# Patient Record
Sex: MALE | Race: WHITE | NOT HISPANIC OR LATINO | Employment: FULL TIME | URBAN - METROPOLITAN AREA
[De-identification: names, ages, dates, MRNs, and addresses within clinical notes are randomized per-mention and may not be internally consistent; named-entity substitution may affect disease eponyms.]

---

## 2020-06-09 ENCOUNTER — HOSPITAL ENCOUNTER (OUTPATIENT)
Dept: URGENT CARE | Facility: CLINIC | Age: 60
Discharge: HOME | End: 2020-06-09
Attending: EMERGENCY MEDICINE

## 2020-07-27 ENCOUNTER — OFFICE VISIT CONVERTED (OUTPATIENT)
Dept: ORTHOPEDIC SURGERY | Facility: CLINIC | Age: 60
End: 2020-07-27
Attending: ORTHOPAEDIC SURGERY

## 2020-08-06 ENCOUNTER — HOSPITAL ENCOUNTER (OUTPATIENT)
Dept: CARDIOLOGY | Facility: HOSPITAL | Age: 60
Discharge: HOME | End: 2020-08-06
Attending: SURGERY

## 2020-08-08 ENCOUNTER — HOSPITAL ENCOUNTER (OUTPATIENT)
Dept: PREADMISSION TESTING | Facility: HOSPITAL | Age: 60
Discharge: HOME | End: 2020-08-08
Attending: SURGERY

## 2020-08-08 LAB — SARS-COV-2 RNA SPEC QL NAA+PROBE: NOT DETECTED

## 2020-08-13 ENCOUNTER — HOSPITAL ENCOUNTER (OUTPATIENT)
Dept: PERIOP | Facility: HOSPITAL | Age: 60
Setting detail: HOSPITAL OUTPATIENT SURGERY
Discharge: HOME | End: 2020-08-13
Attending: SURGERY

## 2020-08-13 LAB
ANION GAP SERPL CALC-SCNC: 13 MMOL/L (ref 8–19)
APTT BLD: 24.6 S (ref 22.2–34.2)
BASOPHILS # BLD AUTO: 0.05 10*3/UL (ref 0–0.2)
BASOPHILS NFR BLD AUTO: 0.9 % (ref 0–3)
BUN SERPL-MCNC: 15 MG/DL (ref 5–25)
BUN/CREAT SERPL: 17 {RATIO} (ref 6–20)
CALCIUM SERPL-MCNC: 9.7 MG/DL (ref 8.7–10.4)
CHLORIDE SERPL-SCNC: 108 MMOL/L (ref 99–111)
CONV ABS IMM GRAN: 0.01 10*3/UL (ref 0–0.2)
CONV CO2: 26 MMOL/L (ref 22–32)
CONV IMMATURE GRAN: 0.2 % (ref 0–1.8)
CREAT UR-MCNC: 0.86 MG/DL (ref 0.7–1.2)
DEPRECATED RDW RBC AUTO: 40.5 FL (ref 35.1–43.9)
EOSINOPHIL # BLD AUTO: 0.16 10*3/UL (ref 0–0.7)
EOSINOPHIL # BLD AUTO: 2.7 % (ref 0–7)
ERYTHROCYTE [DISTWIDTH] IN BLOOD BY AUTOMATED COUNT: 12.8 % (ref 11.6–14.4)
GFR SERPLBLD BASED ON 1.73 SQ M-ARVRAT: >60 ML/MIN/{1.73_M2}
GLUCOSE SERPL-MCNC: 121 MG/DL (ref 70–99)
HCT VFR BLD AUTO: 41.4 % (ref 42–52)
HGB BLD-MCNC: 13.9 G/DL (ref 14–18)
INR PPP: 0.93 (ref 2–3)
LYMPHOCYTES # BLD AUTO: 1.52 10*3/UL (ref 1–5)
LYMPHOCYTES NFR BLD AUTO: 25.9 % (ref 20–45)
MCH RBC QN AUTO: 29.3 PG (ref 27–31)
MCHC RBC AUTO-ENTMCNC: 33.6 G/DL (ref 33–37)
MCV RBC AUTO: 87.2 FL (ref 80–96)
MONOCYTES # BLD AUTO: 0.45 10*3/UL (ref 0.2–1.2)
MONOCYTES NFR BLD AUTO: 7.7 % (ref 3–10)
NEUTROPHILS # BLD AUTO: 3.68 10*3/UL (ref 2–8)
NEUTROPHILS NFR BLD AUTO: 62.6 % (ref 30–85)
NRBC CBCN: 0 % (ref 0–0.7)
OSMOLALITY SERPL CALC.SUM OF ELEC: 296 MOSM/KG (ref 273–304)
PLATELET # BLD AUTO: 177 10*3/UL (ref 130–400)
PMV BLD AUTO: 10.3 FL (ref 9.4–12.4)
POTASSIUM SERPL-SCNC: 4.5 MMOL/L (ref 3.5–5.3)
PROTHROMBIN TIME: 10.2 S (ref 9.4–12)
RBC # BLD AUTO: 4.75 10*6/UL (ref 4.7–6.1)
SODIUM SERPL-SCNC: 142 MMOL/L (ref 135–147)
WBC # BLD AUTO: 5.87 10*3/UL (ref 4.8–10.8)

## 2020-08-27 ENCOUNTER — HOSPITAL ENCOUNTER (OUTPATIENT)
Dept: CARDIOLOGY | Facility: HOSPITAL | Age: 60
Discharge: HOME | End: 2020-08-27
Attending: SURGERY

## 2020-09-09 ENCOUNTER — HOSPITAL ENCOUNTER (OUTPATIENT)
Dept: CARDIOLOGY | Facility: HOSPITAL | Age: 60
Discharge: HOME | End: 2020-09-09
Attending: SURGERY

## 2021-02-01 ENCOUNTER — OFFICE VISIT CONVERTED (OUTPATIENT)
Dept: ORTHOPEDIC SURGERY | Facility: CLINIC | Age: 61
End: 2021-02-01
Attending: ORTHOPAEDIC SURGERY

## 2021-05-10 NOTE — H&P
History and Physical      Patient Name: Rafael Cee   Patient ID: [id number]   Sex: Male   YOB: 1960        Visit Date: February 1, 2021    Provider: Andrez Kelly MD   [facility]   Location Address: [address] [place] [address]  [id number]   Location Phone: [phone]          Chief Complaint  · Right Ankle Pain  · Right Shoulder Pain      History Of Present Illness  Rafael Cee is a 60 year old male who presents today to [facility].      Patient presents today for an evaluation of right ankle pain and right shoulder pain. He has been having pain in his shoulder and ankle for several months. He states 8 months ago he had an injury where he was lifting something heavy at work and felt a sharp pain in his right shoulder. He is also complaining of generalized ankle pain and denies any trauma or injury to his ankle. He states he has had ankle sprains when he was younger.       Past Medical History  Limb Swelling         Past Surgical History  Colonoscopy; Hernia; Tonsillectomy         Medication List  Plavix 75 mg oral tablet         Allergy List  NSAIDS       Allergies Reconciled  Family Medical History  Cancer, Unspecified         Social History  Alcohol Use (Current some day); lives with other; .; Recreational Drug Use (Never); Tobacco (Current every day); Working         Review of Systems  · Constitutional  o Denies  o : fever, chills, weight loss  · Cardiovascular  o Denies  o : chest pain, shortness of breath  · Gastrointestinal  o Denies  o : liver disease, heartburn, nausea, blood in stools  · Genitourinary  o Denies  o : painful urination, blood in urine  · Integument  o Denies  o : rash, itching  · Neurologic  o Denies  o : headache, weakness, loss of consciousness  · Musculoskeletal  o Denies  o : painful, swollen joints  · Psychiatric  o Denies  o : drug/alcohol addiction, anxiety, depression      Vitals  Date Time BP Position Site L\R Cuff  "Size HR RR TEMP (F) WT  HT  BMI kg/m2 BSA m2 O2 Sat FR L/min FiO2 HC       02/01/2021 01:54 PM      70 - R   197lbs 6oz 5'  8\" 30.01 2.07 99 %            Physical Examination  · Constitutional  o Appearance  o : well developed, well-nourished, no obvious deformities present  · Head and Face  o Head  o :   § Inspection  § : normocephalic  o Face  o :   § Inspection  § : no facial lesions  · Eyes  o Conjunctivae  o : conjunctivae normal  o Sclerae  o : sclerae white  · Ears, Nose, Mouth and Throat  o Ears  o :   § External Ears  § : appearance within normal limits  § Hearing  § : intact  o Nose  o :   § External Nose  § : appearance normal  · Neck  o Inspection/Palpation  o : normal appearance  o Range of Motion  o : full range of motion  · Respiratory  o Respiratory Effort  o : breathing unlabored  o Inspection of Chest  o : normal appearance  o Auscultation of Lungs  o : no audible wheezing or rales  · Cardiovascular  o Heart  o : regular rate  · Gastrointestinal  o Abdominal Examination  o : soft and non-tender  · Skin and Subcutaneous Tissue  o General Inspection  o : intact, no rashes  · Psychiatric  o General  o : Alert and oriented x3  o Judgement and Insight  o : judgment and insight intact  o Mood and Affect  o : mood normal, affect appropriate  · Right Shoulder  o Inspection  o : Sensation grossly intact. Neurovascular intact. Skin intact. -30 forward flexion. IR SI joint. No swelling, skin discoloration or atrophy. -20 ER. Good tone of deltoid, biceps, triceps, wrist extensors, and wrist flexors.  · Right Ankle/Foot  o Inspection  o : Skin intact. Full range of motion of the ankle. A pes planus. Achilles intact. No swelling, skin discoloration or atrophy. Calf supple, non-tender. Full weight bearing. Good strength in quadriceps, hamstrings, dorsiflexors, and plantar flexors.  · Injection Note/Aspiration Note  o Site  o : right shoulder  o Procedure  o : Procedure: After educating the patient, patient gave " consent for procedure. After using Chloraprep, the joint space was injected. The patient tolerated the procedure well.   o Medication  o : 80 mg of DepoMedrol with 9cc of 1% Lidocaine  · In Office Procedures  o View  o : AP/LATERAL  o Site  o : right shoulder, right ankle  o Indication  o : Right shoulder pain. Right ankle pain.  o Study  o : X-rays ordered, taken in the office, and reviewed today.  o Xray  o : Negative impressions.           Assessment  · Right ankle pain     719.47/M25.571  · Right shoulder pain, unspecified chronicity     719.41/M25.511  · Adhesive capsulitis of right shoulder     726.0/M75.01  · Acquired pes planus of right foot     734/M21.41      Plan  · Orders  o Ankle (Right) 2 views X-Ray ([id number]-[place]Z) - 719.47/M25.571 - 02/01/2021  o Depo-Medrol injection 80mg () - - 02/01/2021   Lot [id number] Exp 01 2022 [employer] Administered by ANIKA Kelly MD  o Shoulder Intra-articular Injection without US Guidance [facility] (52651) - - 02/01/2021   Lot [id number] Exp 03 01 2022 [employer] Administered by ANIKA Kelly MD  o Scapula (Right) [facility] Preferred View (12773-DE) - 719.41/M25.511 - 02/01/2021  · Medications  o Medications have been Reconciled  o Transition of Care or Provider Policy  · Instructions  o Dr. Kelly saw and examined the patient and agrees with plan.   o X-rays reviewed by Dr. Kelly.  o Reviewed the patient's Past Medical, Social, and Family history as well as the ROS at today's visit, no changes.  o Call or return if worsening symptoms.  o Exercise handout given.  o Follow Up in 4 weeks.   o Follow up in 6 weeks.  o This note was transcribed by Dolores Srinivasan. nurys  o Discussed diagnosis and treatment plans with the patient. Patient wishes to proceed with physical therapy and at home exercise for frozen shoulder. We recommended arch support for the right ankle pain and physical therapy.   · Referrals  o ID: [id number] Date: 01/27/2021 Type: Inbound  Specialty: Orthopedic  Surgery            Electronically Signed by: Dolores Srinivasan-, Other -Author on February 5, 2021 02:49:39 PM  Electronically Co-signed by: Andrez Kelly MD -Reviewer on February 6, 2021 06:42:11 PM

## 2021-05-10 NOTE — H&P
History and Physical      Patient Name: Rafael Cee   Patient ID: [id number]   Sex: Male   YOB: 1960    Referring Provider: Aga ESTEVEZ    Visit Date: July 27, 2020    Provider: Andrez Kelly MD   Location: [facility]   Location Address: [address] [place] [address]  [id number]   Location Phone: [phone]          Chief Complaint  · Left leg pain       History Of Present Illness  Rafael Cee is a 60 year old male who presents today to [facility]. Patient is here for a left lower extremity pain. Patient states he had an ultrasound and x-ray. Patient states this has been going on for 4 months. Patient denies injury or trauma. Patient states when he walks, he has left lower leg fullness and feels like it is very heavy. Patient states he will stop walking, fullness with dissipate, it he starts again fullness will resume. He states it is reproducible and is predictable. He denies pain at night. Patient denies pain in the knee, ankle or groin. Patient had ultrasound and x-rays.       Past Medical History  Limb Swelling         Past Surgical History  Colonoscopy; Hernia; Tonsillectomy         Medication List  Plavix 75 mg oral tablet         Allergy List  NSAIDS         Family Medical History  Cancer, Unspecified         Social History  Alcohol Use (Current some day); lives with other; .; Recreational Drug Use (Never); Tobacco (Current every day); Working         Review of Systems  · Constitutional  o Denies  o : fever, chills, weight loss  · Cardiovascular  o Denies  o : chest pain, shortness of breath  · Gastrointestinal  o Denies  o : liver disease, heartburn, nausea, blood in stools  · Genitourinary  o Denies  o : painful urination, blood in urine  · Integument  o Denies  o : rash, itching  · Neurologic  o Denies  o : headache, weakness, loss of consciousness  · Musculoskeletal  o Admits  o : painful, swollen joints  · Psychiatric  o Denies  o : drug/alcohol  "addiction, anxiety, depression      Vitals  Date Time BP Position Site L\R Cuff Size HR RR TEMP (F) WT  HT  BMI kg/m2 BSA m2 O2 Sat HC       07/27/2020 09:52 AM      77 - R   188lbs 0oz 5'  8\" 28.59 2.02 98 %          Physical Examination  · Constitutional  o Appearance  o : well developed, well-nourished, no obvious deformities present  · Head and Face  o Head  o :   § Inspection  § : normocephalic  o Face  o :   § Inspection  § : no facial lesions  · Eyes  o Conjunctivae  o : conjunctivae normal  o Sclerae  o : sclerae white  · Ears, Nose, Mouth and Throat  o Ears  o :   § External Ears  § : appearance within normal limits  § Hearing  § : intact  o Nose  o :   § External Nose  § : appearance normal  · Neck  o Inspection/Palpation  o : normal appearance  o Range of Motion  o : full range of motion  · Respiratory  o Respiratory Effort  o : breathing unlabored  o Inspection of Chest  o : normal appearance  o Auscultation of Lungs  o : no audible wheezing or rales  · Cardiovascular  o Heart  o : regular rate  · Gastrointestinal  o Abdominal Examination  o : soft and non-tender  · Skin and Subcutaneous Tissue  o General Inspection  o : intact, no rashes  · Psychiatric  o General  o : Alert and oriented x3  o Judgement and Insight  o : judgment and insight intact  o Mood and Affect  o : mood normal, affect appropriate  · Left Hip  o Inspection  o : He has full hip flexion, internal and external rotation. Equal strength bilaterally.  · Left Knee  o Inspection  o : No skin discoloration, atrophy or swelling. Full extension. Full flexion.           Assessment  · Pain of left lower extremity     729.5/M79.605  · Claudication of left lower extremity     443.9/I73.9      Plan  · Medications  o Medications have been Reconciled  o Transition of Care or Provider Policy  · Instructions  o Reviewed the patient's Past Medical, Social, and Family history as well as the ROS at today's visit, no changes.  o Call or return if worsening " symptoms.  o This note is transcribed by Kenya ewing/nurys  o Patient states he has an appointment to see a vascular surgeon. Recommend continue to keep that appointment.             Electronically Signed by: Kenya Mina, -Author on July 29, 2020 12:42:56 PM  Electronically Co-signed by: Yanira Vega PA-C -Reviewer on July 29, 2020 12:46:48 PM  Electronically Co-signed by: Andrez Kelly MD -Reviewer on July 31, 2020 12:30:18 PM

## 2021-05-14 VITALS — HEART RATE: 70 BPM | WEIGHT: 197.37 LBS | BODY MASS INDEX: 29.91 KG/M2 | OXYGEN SATURATION: 99 % | HEIGHT: 68 IN

## 2021-05-15 VITALS — HEIGHT: 68 IN | HEART RATE: 77 BPM | WEIGHT: 188 LBS | OXYGEN SATURATION: 98 % | BODY MASS INDEX: 28.49 KG/M2

## 2021-06-24 ENCOUNTER — TRANSCRIBE ORDERS (OUTPATIENT)
Dept: VASCULAR SURGERY | Facility: HOSPITAL | Age: 61
End: 2021-06-24

## 2021-06-24 DIAGNOSIS — I71.40 ABDOMINAL AORTIC ANEURYSM (AAA) WITHOUT RUPTURE (HCC): Primary | ICD-10-CM

## 2021-12-06 ENCOUNTER — HOSPITAL ENCOUNTER (OUTPATIENT)
Dept: CARDIOLOGY | Facility: HOSPITAL | Age: 61
Discharge: HOME | End: 2021-12-06

## 2021-12-06 ENCOUNTER — OFFICE VISIT (OUTPATIENT)
Dept: VASCULAR SURGERY | Facility: HOSPITAL | Age: 61
End: 2021-12-06

## 2021-12-06 VITALS
TEMPERATURE: 98.5 F | OXYGEN SATURATION: 99 % | RESPIRATION RATE: 18 BRPM | HEART RATE: 64 BPM | DIASTOLIC BLOOD PRESSURE: 113 MMHG | SYSTOLIC BLOOD PRESSURE: 193 MMHG

## 2021-12-06 DIAGNOSIS — I73.9 PAD (PERIPHERAL ARTERY DISEASE) (HCC): Primary | ICD-10-CM

## 2021-12-06 DIAGNOSIS — I71.40 ABDOMINAL AORTIC ANEURYSM (AAA) WITHOUT RUPTURE (HCC): ICD-10-CM

## 2021-12-06 LAB
ABDOMINAL DIST AORTA AP: 1.6 CM
ABDOMINAL DIST AORTA TRANS: 1.6 CM
ABDOMINAL LT COM ILIAC AP: 1 CM
ABDOMINAL LT COM ILIAC TRANS: 1 CM
ABDOMINAL MID AORTA AP: 1.7 CM
ABDOMINAL MID AORTA TRANS: 1.7 CM
ABDOMINAL PROX AORTA AP: 2.2 CM
ABDOMINAL PROX AORTA TRANS: 2.2 CM
ABDOMINAL RT COM ILIAC AP: 1.1 CM
ABDOMINAL RT COM ILIAC TRANS: 1.1 CM
BH CV VAS SMA 1ST PP TIME: 15 MIN
BH CV VAS SMA 2ND PP TIME: 30 MIN
BH CV VAS SMA 3RD PP TIME: 45 MIN
MAXIMAL PREDICTED HEART RATE: 161 BPM
STRESS TARGET HR: 137 BPM

## 2021-12-06 PROCEDURE — 93979 VASCULAR STUDY: CPT

## 2021-12-06 PROCEDURE — 99212 OFFICE O/P EST SF 10 MIN: CPT | Performed by: SURGERY

## 2021-12-06 PROCEDURE — 93979 VASCULAR STUDY: CPT | Performed by: SURGERY

## 2021-12-06 RX ORDER — ERGOCALCIFEROL 1.25 MG/1
50000 CAPSULE ORAL WEEKLY
COMMUNITY
Start: 2021-10-15

## 2021-12-06 RX ORDER — ROSUVASTATIN CALCIUM 20 MG/1
20 TABLET, COATED ORAL DAILY
COMMUNITY
Start: 2021-10-01

## 2021-12-06 RX ORDER — BUPROPION HYDROCHLORIDE 150 MG/1
150 TABLET, EXTENDED RELEASE ORAL 2 TIMES DAILY
COMMUNITY
Start: 2021-11-13

## 2021-12-06 RX ORDER — LISINOPRIL 5 MG/1
5 TABLET ORAL DAILY
COMMUNITY
Start: 2021-10-15

## 2021-12-06 RX ORDER — CLOPIDOGREL BISULFATE 75 MG/1
75 TABLET ORAL DAILY
COMMUNITY
Start: 2021-10-01

## 2021-12-06 NOTE — PROGRESS NOTES
[facility]   Follow up Office    Patient Name: Rafael Cee  : 1960  MRN: [id number]  Primary Care Physician:  Aga Anderson PA-C      Subjective   Subjective     HPI:    Rafael Cee is a 61 y.o. male here for routine follow-up for PAD and an ulcerated plaque in his infrarenal aorta.  He has noticed some tightness in his left calf when he is walking.  Not nearly as bad as it used to be prior to intervention.      Objective     Vitals:   Temp:  [98.5 °F (36.9 °C)] 98.5 °F (36.9 °C)  Heart Rate:  [64] 64  Resp:  [18] 18  BP: (189-193)/(113-116) 193/113    Physical Exam      General: Alert, no acute distress.  Extremities: Symmetric.    Diagnostic studies: An aortic ultrasound in the office today demonstrates no evidence of an aneurysm.    Assessment/Plan   Assessment / Plan     Diagnoses and all orders for this visit:    1. PAD (peripheral artery disease) (HCC) (Primary)  -     Doppler Arterial Multi Level Lower Extremity - Bilateral CAR; Future       Assessment/Plan:   Mr. Cee's aortic ultrasound demonstrates no evidence of aneurysmal change.  There is some evidence of plaque.  He does describe symptoms consistent with moderately limiting intermittent claudication.  At this time the plan is to obtain an arterial Doppler prior to discussing possible reintervention.  He will follow-up with me after the above.        Electronically signed by Dakotah Barrios MD, 21, 10:02 AM EST.

## 2021-12-15 ENCOUNTER — HOSPITAL ENCOUNTER (OUTPATIENT)
Dept: CARDIOLOGY | Facility: HOSPITAL | Age: 61
Discharge: HOME | End: 2021-12-15

## 2021-12-15 ENCOUNTER — OFFICE VISIT (OUTPATIENT)
Dept: VASCULAR SURGERY | Facility: HOSPITAL | Age: 61
End: 2021-12-15

## 2021-12-15 VITALS
RESPIRATION RATE: 16 BRPM | OXYGEN SATURATION: 98 % | HEART RATE: 71 BPM | DIASTOLIC BLOOD PRESSURE: 93 MMHG | TEMPERATURE: 98.3 F | SYSTOLIC BLOOD PRESSURE: 149 MMHG

## 2021-12-15 DIAGNOSIS — I73.9 PAD (PERIPHERAL ARTERY DISEASE) (HCC): ICD-10-CM

## 2021-12-15 DIAGNOSIS — I73.9 PAD (PERIPHERAL ARTERY DISEASE) (HCC): Primary | ICD-10-CM

## 2021-12-15 LAB
BH CV LOWER ARTERIAL LEFT ABI RATIO: 0.81
BH CV LOWER ARTERIAL LEFT DORSALIS PEDIS SYS MAX: 109 MMHG
BH CV LOWER ARTERIAL LEFT GREAT TOE SYS MAX: 100 MMHG
BH CV LOWER ARTERIAL LEFT POPLITEAL SYS MAX: 123 MMHG
BH CV LOWER ARTERIAL LEFT POST TIBIAL SYS MAX: 121 MMHG
BH CV LOWER ARTERIAL LEFT TBI RATIO: 0.67
BH CV LOWER ARTERIAL RIGHT ABI RATIO: 1
BH CV LOWER ARTERIAL RIGHT DORSALIS PEDIS SYS MAX: 145 MMHG
BH CV LOWER ARTERIAL RIGHT GREAT TOE SYS MAX: 126 MMHG
BH CV LOWER ARTERIAL RIGHT LOW THIGH SYS MAX: 148 MMHG
BH CV LOWER ARTERIAL RIGHT POPLITEAL SYS MAX: 155 MMHG
BH CV LOWER ARTERIAL RIGHT POST TIBIAL SYS MAX: 159 MMHG
BH CV LOWER ARTERIAL RIGHT TBI RATIO: 0.85
MAXIMAL PREDICTED HEART RATE: 161 BPM
STRESS TARGET HR: 137 BPM
UPPER ARTERIAL LEFT ARM BRACHIAL SYS MAX: 149 MMHG
UPPER ARTERIAL RIGHT ARM BRACHIAL SYS MAX: 148 MMHG

## 2021-12-15 PROCEDURE — 93923 UPR/LXTR ART STDY 3+ LVLS: CPT

## 2021-12-15 PROCEDURE — 93923 UPR/LXTR ART STDY 3+ LVLS: CPT | Performed by: SURGERY

## 2021-12-15 PROCEDURE — 99212 OFFICE O/P EST SF 10 MIN: CPT | Performed by: SURGERY

## 2021-12-15 NOTE — PROGRESS NOTES
[facility]   Follow up Office    Patient Name: Rafael Cee  : 1960  MRN: [id number]  Primary Care Physician:  Aga Anderson PA-C      Subjective   Subjective     HPI:    Rafael Cee is a 61 y.o. male here for follow-up for peripheral vascular disease after an arterial Doppler.      Objective     Vitals:   Temp:  [98.3 °F (36.8 °C)] 98.3 °F (36.8 °C)  Heart Rate:  [71] 71  Resp:  [16] 16  BP: (148-149)/(86-93) 149/93    Physical Exam      General: Alert, no acute distress.  Extremities: Symmetric.    Diagnostic studies: An arterial Doppler in the office today demonstrates ABIs on the left side of 0.81.  The right side is greater than 1.  The distal waveforms on the left side are biphasic and demonstrate significant blunting when compared to the right.    Assessment/Plan   Assessment / Plan     Diagnoses and all orders for this visit:    1. PAD (peripheral artery disease) (HCC) (Primary)  -     Doppler Arterial Multi Level Lower Extremity - Bilateral CAR; Future       Assessment/Plan:   Mr. Cee has a history and physical findings as well as noninvasive studies consistent with restenosis of his previously intervened area of stenosis in the left superficial femoral artery.  I discussed with him further management options.  We discussed the alternative of no intervention versus repeat angiography with possible endovascular intervention.  At this time he feels that he would rather manage.  His symptoms are limited.  He describes significant pain in the right groin for several days after the previous procedure which gives him pause at this time.  The plan will be for a repeat arterial Doppler in 6 months.  He will follow-up sooner should symptoms worsen.        Electronically signed by Dakotah Barrios MD, 12/15/21, 3:34 PM EST.

## 2022-03-30 ENCOUNTER — OFFICE VISIT (OUTPATIENT)
Dept: VASCULAR SURGERY | Facility: HOSPITAL | Age: 62
End: 2022-03-30

## 2022-03-30 ENCOUNTER — HOSPITAL ENCOUNTER (OUTPATIENT)
Dept: CARDIOLOGY | Facility: HOSPITAL | Age: 62
Discharge: HOME | End: 2022-03-30

## 2022-03-30 VITALS
DIASTOLIC BLOOD PRESSURE: 94 MMHG | HEART RATE: 68 BPM | OXYGEN SATURATION: 98 % | RESPIRATION RATE: 18 BRPM | SYSTOLIC BLOOD PRESSURE: 135 MMHG | TEMPERATURE: 98.5 F

## 2022-03-30 DIAGNOSIS — I73.9 PAD (PERIPHERAL ARTERY DISEASE): ICD-10-CM

## 2022-03-30 DIAGNOSIS — I70.219 ATHEROSCLEROSIS OF LOWER EXTREMITY WITH CLAUDICATION: Primary | ICD-10-CM

## 2022-03-30 LAB
BH CV LOWER ARTERIAL LEFT ABI RATIO: 1
BH CV LOWER ARTERIAL LEFT CALF RATIO: 1.01
BH CV LOWER ARTERIAL LEFT DORSALIS PEDIS SYS MAX: 145
BH CV LOWER ARTERIAL LEFT GREAT TOE SYS MAX: 143
BH CV LOWER ARTERIAL LEFT POPLITEAL SYS MAX: 147
BH CV LOWER ARTERIAL LEFT POST TIBIAL SYS MAX: 146
BH CV LOWER ARTERIAL LEFT TBI RATIO: 0.98
BH CV LOWER ARTERIAL RIGHT ABI RATIO: 1.11
BH CV LOWER ARTERIAL RIGHT CALF RATIO: 1.06
BH CV LOWER ARTERIAL RIGHT DORSALIS PEDIS SYS MAX: 162
BH CV LOWER ARTERIAL RIGHT GREAT TOE SYS MAX: 136
BH CV LOWER ARTERIAL RIGHT LOW THIGH RATIO: 1.18
BH CV LOWER ARTERIAL RIGHT LOW THIGH SYS MAX: 173
BH CV LOWER ARTERIAL RIGHT POPLITEAL SYS MAX: 155
BH CV LOWER ARTERIAL RIGHT POST TIBIAL SYS MAX: 160
BH CV LOWER ARTERIAL RIGHT TBI RATIO: 0.93
MAXIMAL PREDICTED HEART RATE: 161 BPM
STRESS TARGET HR: 137 BPM
UPPER ARTERIAL LEFT ARM BRACHIAL SYS MAX: 135
UPPER ARTERIAL RIGHT ARM BRACHIAL SYS MAX: 146

## 2022-03-30 PROCEDURE — 93923 UPR/LXTR ART STDY 3+ LVLS: CPT

## 2022-03-30 PROCEDURE — 99213 OFFICE O/P EST LOW 20 MIN: CPT | Performed by: SURGERY

## 2022-03-30 PROCEDURE — 93923 UPR/LXTR ART STDY 3+ LVLS: CPT | Performed by: SURGERY

## 2022-03-30 NOTE — PROGRESS NOTES
[facility]   Follow up Office    Patient Name: Rafael Cee  : 1960  MRN: [id number]  Primary Care Physician:  Aga Anderson PA-C      Subjective   Subjective     HPI:    Rafael Cee is a 61 y.o. male with known peripheral vascular disease who in 2020 underwent angiography with endovascular intervention of the left lower extremity.  He did fairly well for about a year but most recently has started having symptoms and since last seen in December his symptoms have worsened.  He has significant left calf pain with ambulation and now most recently has been having pain in the left foot.      Objective     Vitals:   Temp:  [98.5 °F (36.9 °C)] 98.5 °F (36.9 °C)  Heart Rate:  [68] 68  Resp:  [18] 18  BP: (135-146)/(91-94) 135/94    Physical Exam      General: Alert, no acute distress.  Extremities: Symmetric.    Diagnostic studies: An arterial Doppler in the office today demonstrates ABIs of 1.1 on the right, 1.0 on the left.  There is blunting of the left sided waveforms when compared to the right.    Assessment/Plan   Assessment / Plan     Diagnoses and all orders for this visit:    1. Atherosclerosis of lower extremity with claudication (HCC) (Primary)  -     Case Request; Standing  -     Case Request       Assessment/Plan:   Mr. Cee is experiencing significant lifestyle limiting intermittent claudication of the left lower extremity.  Plan angiography with possible endovascular intervention.  I have discussed with the patient in detail the mechanics of the procedure, the indications, benefits, risks, alternatives as well as potential complications to include but not limited to infection, bleeding, inability to cross the occlusion, vascular injury requiring surgical repair.  He appears to understand and desires to proceed.        Electronically signed by Dakotah Barrios MD, 22, 9:33 AM EDT.

## 2022-04-13 NOTE — H&P
[facility]   HISTORY AND PHYSICAL    Patient Name: Rafael Cee  : 1960  MRN: [id number]  Primary Care Physician:  Aga Anderson PA-C  Date of admission: (Not on file)    Subjective   Subjective     Chief Complaint: Left leg claudication    HPI:    Rafael Cee is a 61 y.o. male with known peripheral artery disease with recurrent left leg claudication    Review of Systems    Non contributory except for the History of Present Illness    Personal History     Past Medical History:   Diagnosis Date   • Hyperlipidemia    • Hypertension        No past surgical history on file.    Family History: family history includes Cancer in his mother and sister; Heart attack in his father. Otherwise pertinent FHx was reviewed and not pertinent to current issue.    Social History:  reports that he has been smoking. He has been smoking about 0.50 packs per day. He has never used smokeless tobacco. He reports current alcohol use of about 3.0 standard drinks of alcohol per week. He reports that he does not use drugs.    Home Medications:  No current facility-administered medications on file prior to encounter.     Current Outpatient Medications on File Prior to Encounter   Medication Sig   • buPROPion SR (WELLBUTRIN SR) 150 MG 12 hr tablet Take 150 mg by mouth 2 (Two) Times a Day.   • clopidogrel (PLAVIX) 75 MG tablet Take 75 mg by mouth Daily.   • lisinopril (PRINIVIL,ZESTRIL) 5 MG tablet Take 5 mg by mouth Daily.   • rosuvastatin (CRESTOR) 20 MG tablet Take 20 mg by mouth Daily.   • vitamin D (ERGOCALCIFEROL) 1.25 MG (53749 UT) capsule capsule Take 50,000 Units by mouth 1 (One) Time Per Week.          Allergies:  Allergies   Allergen Reactions   • Nsaids Anaphylaxis       Objective   Objective     Vitals:        Physical Exam   General: Alert, no acute distress.  Neck: Supple  Heart: Regular rate  Lungs: Clear  Abdomen: Benign  Extremities: Symmetric  Pulses: Nonpalpable left pedal pulses.    Diagnostic studies:   An  arterial Doppler dated 3/30/2022 demonstrates ABIs of 1.1 on the right, 1.0 on the left.  There is blunting of the left sided waveforms when compared to the right.    Assessment/Plan   Assessment / Plan     Active Hospital Problems:  Active Hospital Problems    Diagnosis    • **Atherosclerosis of lower extremity with claudication (HCC)      Added automatically from request for surgery [id number]         Diagnoses and all orders for this visit:    1. Atherosclerosis of lower extremity with claudication (HCC)  -     Cardiac Catheterization/Vascular Study; Standing  -     Cardiac Catheterization/Vascular Study        Assessment/plan:   Mr. Cee is experiencing significant lifestyle limiting intermittent claudication of the left lower extremity.  Plan angiography with possible endovascular intervention.  I have discussed with the patient in detail the mechanics of the procedure, the indications, benefits, risks, alternatives as well as potential complications to include but not limited to infection, bleeding, inability to cross the occlusion, vascular injury requiring surgical repair.  He appears to understand and desires to proceed.      Electronically signed by Dakotah Barrios MD, 04/13/22, 3:27 PM EDT.

## 2022-04-14 ENCOUNTER — HOSPITAL ENCOUNTER (OUTPATIENT)
Facility: HOSPITAL | Age: 62
Setting detail: HOSPITAL OUTPATIENT SURGERY
Discharge: HOME | End: 2022-04-14
Attending: SURGERY | Admitting: SURGERY

## 2022-04-14 VITALS
SYSTOLIC BLOOD PRESSURE: 151 MMHG | RESPIRATION RATE: 18 BRPM | HEIGHT: 68 IN | WEIGHT: 190 LBS | TEMPERATURE: 97.9 F | DIASTOLIC BLOOD PRESSURE: 91 MMHG | OXYGEN SATURATION: 100 % | HEART RATE: 72 BPM | BODY MASS INDEX: 28.79 KG/M2

## 2022-04-14 DIAGNOSIS — I70.219 ATHEROSCLEROSIS OF LOWER EXTREMITY WITH CLAUDICATION: ICD-10-CM

## 2022-04-14 LAB
ANION GAP SERPL CALCULATED.3IONS-SCNC: 10 MMOL/L (ref 5–15)
BASOPHILS # BLD AUTO: 0.03 10*3/MM3 (ref 0–0.2)
BASOPHILS NFR BLD AUTO: 0.7 % (ref 0–1.5)
BUN SERPL-MCNC: 14 MG/DL (ref 6–20)
BUN/CREAT SERPL: 16.3 (ref 7–25)
CALCIUM SPEC-SCNC: 9.4 MG/DL (ref 8.6–10.5)
CHLORIDE SERPL-SCNC: 104 MMOL/L (ref 98–107)
CO2 SERPL-SCNC: 23 MMOL/L (ref 22–29)
CREAT SERPL-MCNC: 0.86 MG/DL (ref 0.76–1.27)
DEPRECATED RDW RBC AUTO: 37.3 FL (ref 37–54)
EGFRCR SERPLBLD CKD-EPI 2021: 99.7 ML/MIN/1.73
EOSINOPHIL # BLD AUTO: 0.19 10*3/MM3 (ref 0–0.4)
EOSINOPHIL NFR BLD AUTO: 4.4 % (ref 0.3–6.2)
ERYTHROCYTE [DISTWIDTH] IN BLOOD BY AUTOMATED COUNT: 12.3 % (ref 12.3–15.4)
GLUCOSE SERPL-MCNC: 118 MG/DL (ref 65–99)
HCT VFR BLD AUTO: 43.4 % (ref 37.5–51)
HGB BLD-MCNC: 15.1 G/DL (ref 13–17.7)
IMM GRANULOCYTES # BLD AUTO: 0.01 10*3/MM3 (ref 0–0.05)
IMM GRANULOCYTES NFR BLD AUTO: 0.2 % (ref 0–0.5)
LYMPHOCYTES # BLD AUTO: 1.28 10*3/MM3 (ref 0.7–3.1)
LYMPHOCYTES NFR BLD AUTO: 29.8 % (ref 19.6–45.3)
MCH RBC QN AUTO: 28.9 PG (ref 26.6–33)
MCHC RBC AUTO-ENTMCNC: 34.8 G/DL (ref 31.5–35.7)
MCV RBC AUTO: 83 FL (ref 79–97)
MONOCYTES # BLD AUTO: 0.83 10*3/MM3 (ref 0.1–0.9)
MONOCYTES NFR BLD AUTO: 19.3 % (ref 5–12)
NEUTROPHILS NFR BLD AUTO: 1.96 10*3/MM3 (ref 1.7–7)
NEUTROPHILS NFR BLD AUTO: 45.6 % (ref 42.7–76)
NRBC BLD AUTO-RTO: 0 /100 WBC (ref 0–0.2)
PLATELET # BLD AUTO: 173 10*3/MM3 (ref 140–450)
PMV BLD AUTO: 10.5 FL (ref 6–12)
POTASSIUM SERPL-SCNC: 4.3 MMOL/L (ref 3.5–5.2)
RBC # BLD AUTO: 5.23 10*6/MM3 (ref 4.14–5.8)
SODIUM SERPL-SCNC: 137 MMOL/L (ref 136–145)
WBC NRBC COR # BLD: 4.3 10*3/MM3 (ref 3.4–10.8)

## 2022-04-14 PROCEDURE — C1769 GUIDE WIRE: HCPCS | Performed by: SURGERY

## 2022-04-14 PROCEDURE — C1887 CATHETER, GUIDING: HCPCS | Performed by: SURGERY

## 2022-04-14 PROCEDURE — 25010000002 FENTANYL CITRATE (PF) 50 MCG/ML SOLUTION: Performed by: SURGERY

## 2022-04-14 PROCEDURE — 75710 ARTERY X-RAYS ARM/LEG: CPT | Performed by: SURGERY

## 2022-04-14 PROCEDURE — C1760 CLOSURE DEV, VASC: HCPCS | Performed by: SURGERY

## 2022-04-14 PROCEDURE — C1894 INTRO/SHEATH, NON-LASER: HCPCS | Performed by: SURGERY

## 2022-04-14 PROCEDURE — 75625 CONTRAST EXAM ABDOMINL AORTA: CPT | Performed by: SURGERY

## 2022-04-14 PROCEDURE — 99152 MOD SED SAME PHYS/QHP 5/>YRS: CPT | Performed by: SURGERY

## 2022-04-14 PROCEDURE — 25010000002 HEPARIN (PORCINE) PER 1000 UNITS: Performed by: SURGERY

## 2022-04-14 PROCEDURE — 80048 BASIC METABOLIC PNL TOTAL CA: CPT | Performed by: SURGERY

## 2022-04-14 PROCEDURE — 0 IODIXANOL PER 1 ML: Performed by: SURGERY

## 2022-04-14 PROCEDURE — 25010000002 MIDAZOLAM PER 1 MG: Performed by: SURGERY

## 2022-04-14 PROCEDURE — 37224 PR REVSC OPN/PRG FEM/POP W/ANGIOPLASTY UNI: CPT | Performed by: SURGERY

## 2022-04-14 PROCEDURE — C1725 CATH, TRANSLUMIN NON-LASER: HCPCS | Performed by: SURGERY

## 2022-04-14 PROCEDURE — 85025 COMPLETE CBC W/AUTO DIFF WBC: CPT | Performed by: SURGERY

## 2022-04-14 PROCEDURE — 99153 MOD SED SAME PHYS/QHP EA: CPT | Performed by: SURGERY

## 2022-04-14 PROCEDURE — C2623 CATH, TRANSLUMIN, DRUG-COAT: HCPCS | Performed by: SURGERY

## 2022-04-14 RX ORDER — HEPARIN SODIUM 1000 [USP'U]/ML
INJECTION, SOLUTION INTRAVENOUS; SUBCUTANEOUS AS NEEDED
Status: DISCONTINUED | OUTPATIENT
Start: 2022-04-14 | End: 2022-04-14 | Stop reason: HOSPADM

## 2022-04-14 RX ORDER — MIDAZOLAM HYDROCHLORIDE 1 MG/ML
INJECTION INTRAMUSCULAR; INTRAVENOUS AS NEEDED
Status: DISCONTINUED | OUTPATIENT
Start: 2022-04-14 | End: 2022-04-14 | Stop reason: HOSPADM

## 2022-04-14 RX ORDER — CEFAZOLIN SODIUM 2 G/100ML
2 INJECTION, SOLUTION INTRAVENOUS ONCE
Status: DISCONTINUED | OUTPATIENT
Start: 2022-04-14 | End: 2022-04-14 | Stop reason: HOSPADM

## 2022-04-14 RX ORDER — IODIXANOL 320 MG/ML
INJECTION, SOLUTION INTRAVASCULAR AS NEEDED
Status: DISCONTINUED | OUTPATIENT
Start: 2022-04-14 | End: 2022-04-14 | Stop reason: HOSPADM

## 2022-04-14 RX ORDER — FENTANYL CITRATE 50 UG/ML
INJECTION, SOLUTION INTRAMUSCULAR; INTRAVENOUS AS NEEDED
Status: DISCONTINUED | OUTPATIENT
Start: 2022-04-14 | End: 2022-04-14 | Stop reason: HOSPADM

## 2022-04-14 RX ORDER — LIDOCAINE HYDROCHLORIDE 20 MG/ML
INJECTION, SOLUTION INFILTRATION; PERINEURAL AS NEEDED
Status: DISCONTINUED | OUTPATIENT
Start: 2022-04-14 | End: 2022-04-14 | Stop reason: HOSPADM

## 2022-04-14 RX ORDER — SODIUM CHLORIDE 9 MG/ML
100 INJECTION, SOLUTION INTRAVENOUS CONTINUOUS
Status: DISCONTINUED | OUTPATIENT
Start: 2022-04-14 | End: 2022-04-14 | Stop reason: HOSPADM

## 2022-04-14 NOTE — DISCHARGE INSTRUCTIONS
May remove dressing in 1 day and wash area.  Follow-up in the office in 2 weeks, call for appointment.  Resume home diet.  Resume home medications.  No lifting greater than 15 pounds for 3 days.

## 2022-05-02 ENCOUNTER — OFFICE VISIT (OUTPATIENT)
Dept: VASCULAR SURGERY | Facility: HOSPITAL | Age: 62
End: 2022-05-02

## 2022-05-02 VITALS
DIASTOLIC BLOOD PRESSURE: 92 MMHG | HEART RATE: 68 BPM | SYSTOLIC BLOOD PRESSURE: 162 MMHG | RESPIRATION RATE: 16 BRPM | TEMPERATURE: 98.1 F | OXYGEN SATURATION: 99 %

## 2022-05-02 DIAGNOSIS — I70.219 ATHEROSCLEROSIS OF LOWER EXTREMITY WITH CLAUDICATION: Primary | ICD-10-CM

## 2022-05-02 DIAGNOSIS — I73.9 PAD (PERIPHERAL ARTERY DISEASE): ICD-10-CM

## 2022-05-02 PROCEDURE — G0463 HOSPITAL OUTPT CLINIC VISIT: HCPCS | Performed by: SURGERY

## 2022-05-02 PROCEDURE — 99213 OFFICE O/P EST LOW 20 MIN: CPT | Performed by: SURGERY

## 2022-05-02 RX ORDER — CLOPIDOGREL BISULFATE 75 MG/1
75 TABLET ORAL DAILY
Qty: 30 TABLET | Refills: 1 | Status: SHIPPED | OUTPATIENT
Start: 2022-05-02 | End: 2022-07-01

## 2022-05-02 NOTE — PROGRESS NOTES
[facility]   Follow up Office    Patient Name: Rafael Cee  : 1960  MRN: [id number]  Primary Care Physician:  Aga Anderson PA-C      Subjective   Subjective     HPI:    Rafael Cee is a 62 y.o. male here for routine follow-up after angiography with endovascular intervention 2022.  Doing very good.  No complaints.      Objective     Vitals:   Temp:  [98.1 °F (36.7 °C)] 98.1 °F (36.7 °C)  Heart Rate:  [68] 68  Resp:  [16] 16  BP: (162)/(92) 162/92    Physical Exam      General: Alert, no acute distress.  Extremities: Symmetric.    Assessment/Plan   Assessment / Plan     Diagnoses and all orders for this visit:    1. Atherosclerosis of lower extremity with claudication (HCC) (Primary)  -     Duplex Lower Extremity Art / Grafts - Left CAR; Future  -     clopidogrel (Plavix) 75 MG tablet; Take 1 tablet by mouth Daily for 60 days.  Dispense: 30 tablet; Refill: 1    2. PAD (peripheral artery disease) (HCC)  -     Duplex Lower Extremity Art / Grafts - Left CAR; Future  -     clopidogrel (Plavix) 75 MG tablet; Take 1 tablet by mouth Daily for 60 days.  Dispense: 30 tablet; Refill: 1       Assessment/Plan:   Satisfactory progress following angiography with endovascular intervention.  Plavix daily for 2 months.  Patient allergic to NSAIDs.  Follow-up with a duplex of the left leg in 3 months.        Electronically signed by Dakotah Barrios MD, 22, 9:02 AM EDT.

## 2022-08-03 ENCOUNTER — OFFICE VISIT (OUTPATIENT)
Dept: VASCULAR SURGERY | Facility: HOSPITAL | Age: 62
End: 2022-08-03

## 2022-08-03 ENCOUNTER — HOSPITAL ENCOUNTER (OUTPATIENT)
Dept: CARDIOLOGY | Facility: HOSPITAL | Age: 62
Discharge: HOME | End: 2022-08-03

## 2022-08-03 VITALS
SYSTOLIC BLOOD PRESSURE: 153 MMHG | HEART RATE: 74 BPM | HEIGHT: 68 IN | RESPIRATION RATE: 18 BRPM | BODY MASS INDEX: 28.79 KG/M2 | DIASTOLIC BLOOD PRESSURE: 98 MMHG | TEMPERATURE: 98.4 F | OXYGEN SATURATION: 98 % | WEIGHT: 190 LBS

## 2022-08-03 DIAGNOSIS — I70.219 ATHEROSCLEROSIS OF LOWER EXTREMITY WITH CLAUDICATION: Primary | ICD-10-CM

## 2022-08-03 DIAGNOSIS — I73.9 PAD (PERIPHERAL ARTERY DISEASE): ICD-10-CM

## 2022-08-03 DIAGNOSIS — I70.219 ATHEROSCLEROSIS OF LOWER EXTREMITY WITH CLAUDICATION: ICD-10-CM

## 2022-08-03 LAB
BH CV GRAFT BRACHIAL PRESSURE LEFT: 153 MMHG
BH CV GRAFT BRACHIAL PRESSURE RIGHT: 150 MMHG
BH CV LEA LEFT ANT TIBIAL A DISTAL EDV: 10 CM/S
BH CV LEA LEFT ANT TIBIAL A DISTAL PSV: 59 CM/S
BH CV LEA LEFT ANT TIBIAL A MID EDV: 13 CM/S
BH CV LEA LEFT ANT TIBIAL A MID PSV: 77 CM/S
BH CV LEA LEFT ANT TIBIAL A PROX EDV: 10 CM/S
BH CV LEA LEFT ANT TIBIAL A PROX PSV: 77 CM/S
BH CV LEA LEFT CFA DISTAL EDV: 0 CM/S
BH CV LEA LEFT CFA DISTAL PSV: 178 CM/S
BH CV LEA LEFT CFA PROX EDV: 17 CM/S
BH CV LEA LEFT CFA PROX PSV: 149 CM/S
BH CV LEA LEFT DFA PROX EDV: 0 CM/S
BH CV LEA LEFT DFA PROX PSV: 81 CM/S
BH CV LEA LEFT DPA PRESSURE: 165 MMHG
BH CV LEA LEFT PERONEAL  DISTAL EDV: 11 CM/S
BH CV LEA LEFT PERONEAL  DISTAL PSV: 69 CM/S
BH CV LEA LEFT PERONEAL  MID EDV: 12 CM/S
BH CV LEA LEFT PERONEAL  MID PSV: 66 CM/S
BH CV LEA LEFT POPITEAL A  DISTAL EDV: 13 CM/S
BH CV LEA LEFT POPITEAL A  DISTAL PSV: 100 CM/S
BH CV LEA LEFT POPITEAL A  MID EDV: 18 CM/S
BH CV LEA LEFT POPITEAL A  MID PSV: 148 CM/S
BH CV LEA LEFT POPITEAL A  PROX EDV: 11 CM/S
BH CV LEA LEFT POPITEAL A  PROX PSV: 81 CM/S
BH CV LEA LEFT PTA DISTAL EDV: 10 CM/S
BH CV LEA LEFT PTA DISTAL PSV: 86 CM/S
BH CV LEA LEFT PTA MID EDV: 0 CM/S
BH CV LEA LEFT PTA MID PSV: 55 CM/S
BH CV LEA LEFT PTA PRESSURE: 89 MMHG
BH CV LEA LEFT PTA PROX EDV: 0 CM/S
BH CV LEA LEFT PTA PROX PSV: 107 CM/S
BH CV LEA LEFT SFA DISTAL EDV: 12 CM/S
BH CV LEA LEFT SFA DISTAL PSV: 103 CM/S
BH CV LEA LEFT SFA MID EDV: 0 CM/S
BH CV LEA LEFT SFA MID PSV: 99 CM/S
BH CV LEA LEFT SFA PROX EDV: 0 CM/S
BH CV LEA LEFT SFA PROX PSV: 136 CM/S
BH CV LEA LEFT TIBEOPERONEAL EDV: 10 CM/S
BH CV LEA LEFT TIBEOPERONEAL PSV: 92 CM/S
BH CV LEA RIGHT DPA PRESSURE: 153 MMHG
BH CV LEA RIGHT PTA PRESSURE: 148 MMHG
BH CV LOWER ARTERIAL LEFT ABI RATIO: 1.08
BH CV LOWER ARTERIAL RIGHT ABI RATIO: 1
BH CV VAS LEA LEFT STENT ONE DIST STENT EDV: 12 CM/S
BH CV VAS LEA LEFT STENT ONE DIST STENT PSV: 103 CM/S
BH CV VAS LEA LEFT STENT ONE MID STENT EDV: 0 CM/S
BH CV VAS LEA LEFT STENT ONE MID STENT PSV: 99 CM/S
BH CV VAS LEA LEFT STENT ONE POST STENT EDV: 11 CM/S
BH CV VAS LEA LEFT STENT ONE POST STENT PSV: 56 CM/S
BH CV VAS LEA LEFT STENT ONE PRE STENT EDV: 11 CM/S
BH CV VAS LEA LEFT STENT ONE PRE STENT PSV: 116 CM/S
BH CV VAS LEA LEFT STENT ONE PROX STENT EDV: 11 CM/S
BH CV VAS LEA LEFT STENT ONE PROX STENT PSV: 112 CM/S
MAXIMAL PREDICTED HEART RATE: 160 BPM
STRESS TARGET HR: 136 BPM

## 2022-08-03 PROCEDURE — 93926 LOWER EXTREMITY STUDY: CPT

## 2022-08-03 PROCEDURE — 99213 OFFICE O/P EST LOW 20 MIN: CPT | Performed by: NURSE PRACTITIONER

## 2022-08-03 PROCEDURE — 93926 LOWER EXTREMITY STUDY: CPT | Performed by: SURGERY

## 2022-08-03 NOTE — PROGRESS NOTES
[facility] Vascular Surgery Office Follow Up Note     Date of Encounter: 08/03/2022     MRN Number: [id number]  Name: Rafael Cee  Phone Number: [phone]     Referred By: No ref. provider found  PCP: Aga Anderson PA-C    Chief Complaint:    Chief Complaint   Patient presents with   • Follow-up     3 month follow up with testing   Mallory rt pt 0.97 dp 1.00  Lt pt 0.58 dp 1.08       Subjective      History of Present Illness:  Rafael Cee is a 62 y.o. male presents for 3-month follow-up following a angiogram with endovascular intervention on 4/14/2022.  Mr. Mensah states that his legs are not much better after walking a short distance.  He states that he cannot even take his trash cans to the end of the driveway without extreme pain from the knees down.   He describes the pain as a extreme tightness/cramp  In the lower extremity (especially the calves) and if he tries to continues it progresses above the knee and he feels his feet are going to explode.  He denies any back pain or injuries. No other complaints at this time.    Review of Systems:  Review of Systems   Constitutional: Negative.   HENT: Negative.    Cardiovascular: Negative.    Respiratory: Negative.    Skin: Negative.    Musculoskeletal: Negative.    Gastrointestinal: Negative.    Neurological: Negative.    Psychiatric/Behavioral: Negative.      I have reviewed the following portions of the patient's history: allergies, current medications, past family history, past medical history, past social history, past surgical history and problem list and confirm it's accurate.    Allergies:  Allergies   Allergen Reactions   • Aspirin Anaphylaxis     unsure   • Ibuprofen Anaphylaxis   • Nsaids Anaphylaxis       Medications:      Current Outpatient Medications:   •  buPROPion SR (WELLBUTRIN SR) 150 MG 12 hr tablet, Take 150 mg by mouth 2 (Two) Times a Day., Disp: , Rfl:   •  clopidogrel (PLAVIX) 75 MG tablet, Take 75 mg by mouth Daily., Disp:  ", Rfl:   •  lisinopril (PRINIVIL,ZESTRIL) 5 MG tablet, Take 5 mg by mouth Daily., Disp: , Rfl:   •  rosuvastatin (CRESTOR) 20 MG tablet, Take 20 mg by mouth Daily., Disp: , Rfl:   •  vitamin D (ERGOCALCIFEROL) 1.25 MG (03289 UT) capsule capsule, Take 50,000 Units by mouth 1 (One) Time Per Week., Disp: , Rfl:     History:   Past Medical History:   Diagnosis Date   • Hyperlipidemia    • Hypertension        Past Surgical History:   Procedure Laterality Date   • CARDIAC CATHETERIZATION Left 4/14/2022    Procedure: Left leg angiogram, possible angioplasty or stenting;  Surgeon: Dakotah Barrios MD;  Location: [facility] CATH INVASIVE LOCATION;  Service: Vascular;  Laterality: Left;       Social History     Socioeconomic History   • Marital status: Legally    • Number of children: 10   Tobacco Use   • Smoking status: Current Every Day Smoker     Packs/day: 1.00   • Smokeless tobacco: Never Used   • Tobacco comment: PT IS TRYING TO QUIT   Vaping Use   • Vaping Use: Never used   Substance and Sexual Activity   • Alcohol use: Yes     Alcohol/week: 3.0 standard drinks     Types: 3 Cans of beer per week   • Drug use: Never   • Sexual activity: Defer        Family History   Problem Relation Age of Onset   • Cancer Mother    • Heart attack Father    • Cancer Sister        Objective     Physical Exam:  Vitals:    08/03/22 0948 08/03/22 0949 08/03/22 0950 08/03/22 0951   BP: 173/91 150/91 153/96 153/98   BP Location: Right arm Right arm Left arm Left arm   Patient Position: Sitting Sitting Sitting Sitting   Cuff Size: Adult Adult Adult Adult   Pulse:    74   Resp:    18   Temp:    98.4 °F (36.9 °C)   TempSrc:    Temporal   SpO2:    98%   Weight:    86.2 kg (190 lb)   Height:    172.7 cm (68\")      Body mass index is 28.89 kg/m².    Physical Exam  Physical Exam  Constitutional:       Appearance: Normal appearance.   HENT:      Head: Normocephalic.   Cardiovascular:      Rate and Rhythm: Normal rate.      Pulses: Normal pulses. "      Comments: Lateral lower extremities +2 palpable dorsalis pedis pulse.  Pulmonary:      Effort: Pulmonary effort is normal.   Musculoskeletal:         General: Normal range of motion.      Cervical back: Normal range of motion.   Skin:     General: Skin is warm and dry.      Capillary Refill: Capillary refill takes less than 2 seconds.      Comments: Bilateral lower extremities: Warm, no open ulcers.  Neurological:      General: No focal deficit present.      Mental Status: Alert and oriented to person, place, and time.   Psychiatric:         Mood and Affect: Mood normal.         Behavior: Behavior normal.    Imaging/Labs:    I have reviewed the preliminary results of the arterial Doppler performed today.  The left stents appear patent and ABIs are 1 on the right and 1.08 on the left.        Assessment / Plan      Assessment / Plan:  Diagnoses and all orders for this visit:    1. Atherosclerosis of lower extremity with claudication (HCC) (Primary)  -     Doppler Arterial Lower Extremity Stress CAR; Future       Mr. Cee continues to complain of lifestyle limiting claudication symptoms after walking a short distance.  The duplex today reveals the left leg stent is patent along with ABIs that are 1.0 on the right and 1.08 on the left.  I recommend that he continue taking his plavix (he is allergic to aspirin) and that we obtain a arterial Doppler with exercise stress.  I have answered all of his questions and he is in agreement with the plan at this time.    Thank you for allowing me to participate in your patient's care.    Patient Education: Encouraged smoking cessation and he acknowledges the need to quit but denies at this time.        Follow Up:   Return for arterial doppler with stress .   Or sooner for any further concerns or worsening sign and symptoms. If unable to reach us in the office please dial 911 or go to the nearest emergency department.      José Luis BURNS  [facility] Vascular  Surgery

## 2022-08-29 ENCOUNTER — HOSPITAL ENCOUNTER (OUTPATIENT)
Dept: CARDIOLOGY | Facility: HOSPITAL | Age: 62
Discharge: HOME | End: 2022-08-29

## 2022-08-29 ENCOUNTER — OFFICE VISIT (OUTPATIENT)
Dept: VASCULAR SURGERY | Facility: HOSPITAL | Age: 62
End: 2022-08-29

## 2022-08-29 VITALS
SYSTOLIC BLOOD PRESSURE: 138 MMHG | HEART RATE: 77 BPM | BODY MASS INDEX: 28.79 KG/M2 | HEIGHT: 68 IN | RESPIRATION RATE: 16 BRPM | OXYGEN SATURATION: 98 % | TEMPERATURE: 97.8 F | WEIGHT: 190 LBS | DIASTOLIC BLOOD PRESSURE: 82 MMHG

## 2022-08-29 DIAGNOSIS — I70.219 ATHEROSCLEROSIS OF LOWER EXTREMITY WITH CLAUDICATION: Primary | ICD-10-CM

## 2022-08-29 DIAGNOSIS — I70.219 ATHEROSCLEROSIS OF LOWER EXTREMITY WITH CLAUDICATION: ICD-10-CM

## 2022-08-29 LAB
BH CV LOWER ARTERIAL LEFT ABI RATIO: 1.11
BH CV LOWER ARTERIAL LEFT DORSALIS PEDIS SYS MAX: 143
BH CV LOWER ARTERIAL LEFT GREAT TOE SYS MAX: 119
BH CV LOWER ARTERIAL LEFT POPLITEAL SYS MAX: 152
BH CV LOWER ARTERIAL LEFT POST TIBIAL SYS MAX: 166
BH CV LOWER ARTERIAL LEFT TBI RATIO: 0.79
BH CV LOWER ARTERIAL RIGHT ABI RATIO: 0.98
BH CV LOWER ARTERIAL RIGHT DORSALIS PEDIS SYS MAX: 142
BH CV LOWER ARTERIAL RIGHT GREAT TOE SYS MAX: 129
BH CV LOWER ARTERIAL RIGHT LOW THIGH SYS MAX: 158
BH CV LOWER ARTERIAL RIGHT POPLITEAL SYS MAX: 145
BH CV LOWER ARTERIAL RIGHT POST TIBIAL SYS MAX: 145
BH CV LOWER ARTERIAL RIGHT TBI RATIO: 0.86
MAXIMAL PREDICTED HEART RATE: 160 BPM
STRESS TARGET HR: 136 BPM
UPPER ARTERIAL LEFT ARM BRACHIAL SYS MAX: 139 MMHG
UPPER ARTERIAL RIGHT ARM BRACHIAL SYS MAX: 150 MMHG

## 2022-08-29 PROCEDURE — 99213 OFFICE O/P EST LOW 20 MIN: CPT | Performed by: NURSE PRACTITIONER

## 2022-08-29 PROCEDURE — 93924 LWR XTR VASC STDY BILAT: CPT

## 2022-08-29 PROCEDURE — 93924 LWR XTR VASC STDY BILAT: CPT | Performed by: SURGERY

## 2022-08-29 NOTE — PROGRESS NOTES
[facility] Vascular Surgery Office Follow Up Note     Date of Encounter: 08/29/2022     MRN Number: [id number]  Name: Rafael Cee  Phone Number: [phone]     Referred By: No ref. provider found  PCP: Aga Anderson PA-C    Chief Complaint:    Chief Complaint   Patient presents with   • Follow-up     Follow up with adelina        Subjective      History of Present Illness:    Rafael Cee is a 62 y.o. male presents for follow-up with an arterial with stress.  He states that his leg pain after walking a short distance or incline has not improved.  He states his driveway is the worst because it is an incline that he has to stop because of the calf pain.  He is a current everyday smoker, smokes approximately 1 pack/day.     Review of Systems:  ROS  Review of Systems   Constitutional: Negative.   HENT: Negative.    Cardiovascular: Negative.    Respiratory: Negative.    Skin: Negative.    Musculoskeletal:  Bilateral leg pain.   Gastrointestinal: Negative.    Neurological: Negative.    Psychiatric/Behavioral: Negative.      I have reviewed the following portions of the patient's history: allergies, current medications, past family history, past medical history, past social history, past surgical history and problem list and confirm it's accurate.    Allergies:  Allergies   Allergen Reactions   • Aspirin Anaphylaxis     unsure   • Ibuprofen Anaphylaxis   • Nsaids Anaphylaxis       Medications:      Current Outpatient Medications:   •  buPROPion SR (WELLBUTRIN SR) 150 MG 12 hr tablet, Take 150 mg by mouth 2 (Two) Times a Day., Disp: , Rfl:   •  clopidogrel (PLAVIX) 75 MG tablet, Take 75 mg by mouth Daily., Disp: , Rfl:   •  lisinopril (PRINIVIL,ZESTRIL) 5 MG tablet, Take 5 mg by mouth Daily., Disp: , Rfl:   •  rosuvastatin (CRESTOR) 20 MG tablet, Take 20 mg by mouth Daily., Disp: , Rfl:   •  vitamin D (ERGOCALCIFEROL) 1.25 MG (27548 UT) capsule capsule, Take 50,000 Units by mouth 1 (One) Time Per Week.,  "Disp: , Rfl:     History:   Past Medical History:   Diagnosis Date   • Hyperlipidemia    • Hypertension        Past Surgical History:   Procedure Laterality Date   • CARDIAC CATHETERIZATION Left 4/14/2022    Procedure: Left leg angiogram, possible angioplasty or stenting;  Surgeon: Dakotah Barrios MD;  Location: [facility] INVASIVE LOCATION;  Service: Vascular;  Laterality: Left;       Social History     Socioeconomic History   • Marital status: Legally    • Number of children: 10   Tobacco Use   • Smoking status: Current Every Day Smoker     Packs/day: 1.00   • Smokeless tobacco: Never Used   • Tobacco comment: PT IS TRYING TO QUIT   Vaping Use   • Vaping Use: Never used   Substance and Sexual Activity   • Alcohol use: Yes     Alcohol/week: 3.0 standard drinks     Types: 3 Cans of beer per week   • Drug use: Never   • Sexual activity: Defer        Family History   Problem Relation Age of Onset   • Cancer Mother    • Heart attack Father    • Cancer Sister        Objective     Physical Exam:  Vitals:    08/29/22 1526   BP: 138/82   BP Location: Right arm   Patient Position: Sitting   Cuff Size: Adult   Pulse: 77   Resp: 16   Temp: 97.8 °F (36.6 °C)   TempSrc: Temporal   SpO2: 98%   Weight: 86.2 kg (190 lb)   Height: 172.7 cm (68\")   PainSc: 0-No pain      Body mass index is 28.89 kg/m².    Physical Exam  Physical Exam  Constitutional:       Appearance: Normal appearance.   HENT:      Head: Normocephalic.   Cardiovascular:      Rate and Rhythm: Normal rate.      Pulses: Normal pulses.   Pulmonary:      Effort: Pulmonary effort is normal.   Musculoskeletal:         General: Normal range of motion.      Cervical back: Normal range of motion.   Skin:     General: Skin is warm and dry.      Capillary Refill: Capillary refill takes less than 2 seconds.   Neurological:      General: No focal deficit present.      Mental Status: Alert and oriented to person, place, and time.   Psychiatric:         Mood and Affect: " Mood normal.         Behavior: Behavior normal.    Imaging/Labs:  I have reviewed the preliminary results of the arterial Doppler with stress performed today.  ABIs are normal prestress however are positive for claudication and exercise-induced.        Assessment / Plan      Assessment / Plan:  Diagnoses and all orders for this visit:    1. Atherosclerosis of lower extremity with claudication (HCC) (Primary)  -     CT Angio Abdominal Aorta Bilateral Iliofem Runoff; Future    Mr. Cee continues to have lifestyle limiting claudication symptoms.  The arterial Doppler with stress that was performed today is indicated for exercise-induced claudication. I have consulted with Dr. Barrios and he is recommended a CTA of the abdomen and pelvis with runoff and follow-up with him.  I have answered all of his questions and he is in agreement with the plan at this time.    Thank you for allowing me to participate in your patient's care.    Patient Education: Smoking cessation discussed.        Follow Up:   No follow-ups on file.   Or sooner for any further concerns or worsening sign and symptoms. If unable to reach us in the office please dial 911 or go to the nearest emergency department.      José Luis BURNS  [facility] Vascular Surgery    .

## 2022-09-27 ENCOUNTER — APPOINTMENT (OUTPATIENT)
Dept: CT IMAGING | Facility: HOSPITAL | Age: 62
End: 2022-09-27

## 2022-10-20 ENCOUNTER — HOSPITAL ENCOUNTER (OUTPATIENT)
Dept: CT IMAGING | Facility: HOSPITAL | Age: 62
Discharge: HOME | End: 2022-10-20
Admitting: NURSE PRACTITIONER

## 2022-10-20 DIAGNOSIS — I70.219 ATHEROSCLEROSIS OF LOWER EXTREMITY WITH CLAUDICATION: ICD-10-CM

## 2022-10-20 LAB
CREAT BLDA-MCNC: 1 MG/DL
EGFRCR SERPLBLD CKD-EPI 2021: 86.2 ML/MIN/1.73

## 2022-10-20 PROCEDURE — 75635 CT ANGIO ABDOMINAL ARTERIES: CPT

## 2022-10-20 PROCEDURE — 0 IOPAMIDOL PER 1 ML: Performed by: NURSE PRACTITIONER

## 2022-10-20 PROCEDURE — 82565 ASSAY OF CREATININE: CPT

## 2022-10-20 RX ADMIN — IOPAMIDOL 100 ML: 755 INJECTION, SOLUTION INTRAVENOUS at 16:48

## 2022-11-30 ENCOUNTER — OFFICE VISIT (OUTPATIENT)
Dept: VASCULAR SURGERY | Facility: HOSPITAL | Age: 62
End: 2022-11-30

## 2022-11-30 VITALS
RESPIRATION RATE: 18 BRPM | TEMPERATURE: 98.9 F | HEART RATE: 80 BPM | DIASTOLIC BLOOD PRESSURE: 110 MMHG | SYSTOLIC BLOOD PRESSURE: 160 MMHG | OXYGEN SATURATION: 98 %

## 2022-11-30 DIAGNOSIS — M48.062 SPINAL STENOSIS OF LUMBAR REGION WITH NEUROGENIC CLAUDICATION: Primary | ICD-10-CM

## 2022-11-30 DIAGNOSIS — I70.219 ATHEROSCLEROSIS OF LOWER EXTREMITY WITH CLAUDICATION: ICD-10-CM

## 2022-11-30 PROCEDURE — 99214 OFFICE O/P EST MOD 30 MIN: CPT | Performed by: SURGERY

## 2022-11-30 PROCEDURE — G0463 HOSPITAL OUTPT CLINIC VISIT: HCPCS | Performed by: SURGERY

## 2022-11-30 NOTE — PROGRESS NOTES
[facility]   Follow up Office    Patient Name: Rafael Cee  : 1960  MRN: [id number]  Primary Care Physician:  Aga Anderson PA-C      Subjective   Subjective     HPI:    Rafael Cee is a 62 y.o. male here for follow-up for his peripheral vascular disease.  He returns after a CTA.  Doing well.  No new complaints.  He indicates that his symptoms appear to be variable.  Sometimes he has pain in his left leg, sometimes in his right, sometimes in both.  Symptoms seem worse when walking uphill.      Objective     Vitals:   Temp:  [98.9 °F (37.2 °C)] 98.9 °F (37.2 °C)  Heart Rate:  [80] 80  Resp:  [18] 18  BP: (160-168)/(110-112) 160/110    Physical Exam      General: Alert, no acute distress.  Extremities: Symmetric.    Diagnostic studies: A CTA dated 10/20/2022 has been reviewed.  Mild disease can be identified at various locations to include most significantly the left common femoral artery, the left distal superficial femoral artery and proximal below-knee popliteal artery, the right superficial femoral artery.  The most significant of these appears to be the left common femoral artery which appears to be approximately 50%.    Assessment & Plan   Assessment / Plan     Diagnoses and all orders for this visit:    1. Spinal stenosis of lumbar region with neurogenic claudication (Primary)  -     MRI lumbar spine w contrast; Future  -     Ambulatory Referral to Neurology    2. Atherosclerosis of lower extremity with claudication (HCC)  -     Doppler Arterial Multi Level Lower Extremity - Bilateral CAR; Future       Assessment/Plan:   Mr. Cee has symptoms which appear [place] and affect both lower extremities.  His CTA demonstrates some disease but it seems fairly mild.  His exercise Doppler in fact demonstrated excellent results on the left side with moderate results in the right.  It would seem that his symptoms are not vascular in nature.  At this time I am recommending that we obtain an MRI of his  spine and refer him to neurology for further evaluation.  He will follow-up with me with an arterial Doppler in 6 months.  He appears to understand and agrees with the plan.        Electronically signed by Dakotah Barrios MD, 11/30/22, 9:27 AM EST.   Unknown if ever smoked

## 2022-12-27 ENCOUNTER — HOSPITAL ENCOUNTER (OUTPATIENT)
Dept: MRI IMAGING | Facility: HOSPITAL | Age: 62
Discharge: HOME | End: 2022-12-27
Admitting: SURGERY

## 2022-12-27 DIAGNOSIS — M48.062 SPINAL STENOSIS OF LUMBAR REGION WITH NEUROGENIC CLAUDICATION: ICD-10-CM

## 2022-12-27 PROCEDURE — 72148 MRI LUMBAR SPINE W/O DYE: CPT

## 2023-03-14 ENCOUNTER — OFFICE VISIT (OUTPATIENT)
Dept: NEUROSURGERY | Facility: CLINIC | Age: 63
End: 2023-03-14
Payer: OTHER GOVERNMENT

## 2023-03-14 VITALS
OXYGEN SATURATION: 100 % | SYSTOLIC BLOOD PRESSURE: 190 MMHG | DIASTOLIC BLOOD PRESSURE: 95 MMHG | HEART RATE: 69 BPM | BODY MASS INDEX: 31.28 KG/M2 | HEIGHT: 68 IN | WEIGHT: 206.4 LBS

## 2023-03-14 DIAGNOSIS — M54.41 CHRONIC MIDLINE LOW BACK PAIN WITH BILATERAL SCIATICA: Primary | ICD-10-CM

## 2023-03-14 DIAGNOSIS — G89.29 CHRONIC MIDLINE LOW BACK PAIN WITH BILATERAL SCIATICA: Primary | ICD-10-CM

## 2023-03-14 DIAGNOSIS — M48.061 FORAMINAL STENOSIS OF LUMBAR REGION: ICD-10-CM

## 2023-03-14 DIAGNOSIS — M54.42 CHRONIC MIDLINE LOW BACK PAIN WITH BILATERAL SCIATICA: Primary | ICD-10-CM

## 2023-03-14 PROCEDURE — 99204 OFFICE O/P NEW MOD 45 MIN: CPT | Performed by: NEUROLOGICAL SURGERY

## 2023-03-14 RX ORDER — GABAPENTIN 300 MG/1
300 CAPSULE ORAL 3 TIMES DAILY
Qty: 90 CAPSULE | Refills: 1 | Status: SHIPPED | OUTPATIENT
Start: 2023-03-14

## 2023-03-14 NOTE — PROGRESS NOTES
"Chief Complaint  Back pain and leg pain    Subjective          Rafael Cee who is a 62 y.o. year old male who presents to [facility] for Evaluation of the Spine.     The patient complains of pain located in the lumbar spine with bilateral leg pain.  Patients states the pain has been present for 3 years.  The pain came on gradually.  The pain scale level is up to 10/10 with walking, no pain at rest.  The pain radiates into the bilateral legs.  The pain is waxing/waning and described as sharp.  The pain is worse at no particular time of day. Patient states walking, but not standing makes the pain worse.  Patient states rest/sitting or stopping if walking makes the pain better. He has had stents placed by vascular surgery on the left which seemed to resolve all the issues for about 8 months.     Associated Symptoms Include: Denies numbness and tingling  Conservative Interventions Include: Besides the stents, he has not had any additional treatments    Was this the result of an injury or accident?: No    History of Previous Spinal Surgery?: No    This patient  reports that he has been smoking cigarettes. He has been smoking an average of .5 packs per day. He has never used smokeless tobacco.    Review of Systems     Objective   Vital Signs:   BP (!) 190/95 (BP Location: Left arm, Patient Position: Sitting, Cuff Size: Adult)   Pulse 69   Ht 172.7 cm (68\")   Wt 93.6 kg (206 lb 6.4 oz)   SpO2 100%   BMI 31.38 kg/m²       Physical Exam  Constitutional:       Comments: BMI 31   Cardiovascular:      Comments: No edema  Pulmonary:      Effort: Pulmonary effort is normal.   Musculoskeletal:         General: No tenderness.   Neurological:      Mental Status: He is alert.      Sensory: No sensory deficit.      Motor: No weakness.      Deep Tendon Reflexes: Reflexes normal (1/4).   Psychiatric:         Mood and Affect: Mood normal.             Result Review :   I personally " reviewed the patient's MRI scan which shows DDD at multiple levels. No notable spinal canal narrowing. Mild to moderate foraminal narrowing at multiple levels.        Assessment and Plan    Diagnoses and all orders for this visit:    1. Chronic midline low back pain with bilateral sciatica (Primary)  -     gabapentin (NEURONTIN) 300 MG capsule; Take 1 capsule by mouth 3 (Three) Times a Day.  Dispense: 90 capsule; Refill: 1    2. Foraminal stenosis of lumbar region  Comments:  Mild at multiple levels  Orders:  -     gabapentin (NEURONTIN) 300 MG capsule; Take 1 capsule by mouth 3 (Three) Times a Day.  Dispense: 90 capsule; Refill: 1    He doesn't have classic neurogenic claudication. He has no pain with standing, only walking and it is in a non-dermatomal pattern. I will try a course of gabapentin to see if this helps the neuropathic pain.     We discussed the importance of smoking/nicotine cessation. Smoking/nicotine use has multiple health risks. In particular related to the spine, nicotine increases the incidence of lower back pain, speeds up the progression of degenerative disc disease and dramatically reduces healing after spine surgery (particularly a fusion operation).     We discussed the importance of core strengthening, avoidance of activities that worsen the pain, nicotine cessation and maintenance of healthy weight. Surgery for patients with multilevel degenerative disc disease is not typically successful with the risks outweighing the benefit.       Follow Up   Return if symptoms worsen or fail to improve.  Patient was given instructions and counseling regarding his condition or for health maintenance advice. Please see specific information pulled into the AVS if appropriate.

## 2023-03-15 ENCOUNTER — PATIENT ROUNDING (BHMG ONLY) (OUTPATIENT)
Dept: NEUROSURGERY | Facility: CLINIC | Age: 63
End: 2023-03-15
Payer: OTHER GOVERNMENT

## 2023-05-17 ENCOUNTER — TELEPHONE (OUTPATIENT)
Dept: VASCULAR SURGERY | Facility: HOSPITAL | Age: 63
End: 2023-05-17
Payer: OTHER GOVERNMENT

## 2023-05-17 NOTE — TELEPHONE ENCOUNTER
LEFT MESSAGE FOR PATIENT TO CALL ME REGARDING  REFERRAL   ALSO CALLED PCP REGARDING REFERRAL AND TALKED TO BETTYE. NO REFERRAL HAD BEEN STARTED SO SHE IS GOING TO REQUEST ONE

## 2023-06-05 ENCOUNTER — HOSPITAL ENCOUNTER (OUTPATIENT)
Dept: CARDIOLOGY | Facility: HOSPITAL | Age: 63
Discharge: HOME | End: 2023-06-05
Payer: OTHER GOVERNMENT

## 2023-06-05 ENCOUNTER — OFFICE VISIT (OUTPATIENT)
Dept: VASCULAR SURGERY | Facility: HOSPITAL | Age: 63
End: 2023-06-05
Payer: OTHER GOVERNMENT

## 2023-06-05 VITALS
HEART RATE: 56 BPM | SYSTOLIC BLOOD PRESSURE: 170 MMHG | TEMPERATURE: 98.4 F | RESPIRATION RATE: 16 BRPM | OXYGEN SATURATION: 100 % | DIASTOLIC BLOOD PRESSURE: 100 MMHG

## 2023-06-05 DIAGNOSIS — I73.9 PAD (PERIPHERAL ARTERY DISEASE): Primary | ICD-10-CM

## 2023-06-05 DIAGNOSIS — I70.219 ATHEROSCLEROSIS OF LOWER EXTREMITY WITH CLAUDICATION: ICD-10-CM

## 2023-06-05 LAB
BH CV LOWER ARTERIAL LEFT ABI RATIO: 1.01
BH CV LOWER ARTERIAL LEFT DORSALIS PEDIS SYS MAX: 160
BH CV LOWER ARTERIAL LEFT GREAT TOE SYS MAX: 153
BH CV LOWER ARTERIAL LEFT POPLITEAL SYS MAX: 178
BH CV LOWER ARTERIAL LEFT POST TIBIAL SYS MAX: 181
BH CV LOWER ARTERIAL LEFT TBI RATIO: 0.85
BH CV LOWER ARTERIAL RIGHT ABI RATIO: 0.84
BH CV LOWER ARTERIAL RIGHT DORSALIS PEDIS SYS MAX: 149
BH CV LOWER ARTERIAL RIGHT GREAT TOE SYS MAX: 142
BH CV LOWER ARTERIAL RIGHT LOW THIGH SYS MAX: 165
BH CV LOWER ARTERIAL RIGHT POPLITEAL SYS MAX: 142
BH CV LOWER ARTERIAL RIGHT POST TIBIAL SYS MAX: 151
BH CV LOWER ARTERIAL RIGHT TBI RATIO: 0.79
UPPER ARTERIAL LEFT ARM BRACHIAL SYS MAX: 163 MMHG
UPPER ARTERIAL RIGHT ARM BRACHIAL SYS MAX: 180 MMHG

## 2023-06-05 PROCEDURE — 99212 OFFICE O/P EST SF 10 MIN: CPT | Performed by: SURGERY

## 2023-06-05 PROCEDURE — 93923 UPR/LXTR ART STDY 3+ LVLS: CPT

## 2023-06-05 PROCEDURE — 93923 UPR/LXTR ART STDY 3+ LVLS: CPT | Performed by: SURGERY

## 2023-06-05 NOTE — PROGRESS NOTES
[facility]   Follow up Office    Patient Name: Rafael Cee  : 1960  MRN: [id number]  Primary Care Physician:  Aga Anderson PA-C      Subjective   Subjective     HPI:    Rafael Cee is a 63 y.o. male here for follow-up for peripheral artery disease.  He was seen by neurosurgery but it was felt that his symptoms were not classic neurogenic claudication.  He still has some symptoms in his legs, right side worse than left.  If he is standing for all little while or he is doing some regular walking he feels like his right leg is going to explode.  He was recently taken off cholesterol medication to see if that makes a difference.      Objective     Vitals:   Temp:  [98.4 °F (36.9 °C)] 98.4 °F (36.9 °C)  Heart Rate:  [56] 56  Resp:  [16] 16  BP: (163-180)/(100-106) 170/100    Physical Exam      General: Alert, no acute distress.  Extremities: Symmetric.    Diagnostic studies: An arterial Doppler in the office today demonstrates ABIs that are mildly reduced on the right at 0.84, normal on the left at 1.01.    Assessment & Plan   Assessment / Plan     Diagnoses and all orders for this visit:    1. PAD (peripheral artery disease) (Primary)       Assessment/Plan:   Mr. Cee has symptoms that would appear to be much more severe than expected from his Doppler.  He was evaluated by neurosurgery who did not feel his symptoms were classic for neurogenic claudication.  I discussed with him the option of proceeding to angiography of the right lower extremity with possible endovascular intervention understanding that the chances of helping him may be limited.  At this time however because he was just recently taken off the cholesterol medication he is going to wait and see you if that helps.  The plan will be for him to follow-up with us in 1 year with a repeat Doppler.  He will call us sooner should he decide to proceed with angiography.        Electronically signed by Dakotah Barrios MD, 23, 9:45 AM  EDT.

## 2023-08-21 ENCOUNTER — TRANSCRIBE ORDERS (OUTPATIENT)
Dept: LAB | Facility: HOSPITAL | Age: 63
End: 2023-08-21
Payer: OTHER GOVERNMENT

## 2023-08-21 ENCOUNTER — LAB (OUTPATIENT)
Dept: LAB | Facility: HOSPITAL | Age: 63
End: 2023-08-21
Payer: OTHER GOVERNMENT

## 2023-08-21 DIAGNOSIS — R07.9 CHEST PAIN, UNSPECIFIED TYPE: Primary | ICD-10-CM

## 2023-08-21 DIAGNOSIS — R07.9 CHEST PAIN, UNSPECIFIED TYPE: ICD-10-CM

## 2023-08-21 LAB
ALBUMIN SERPL-MCNC: 4.1 G/DL (ref 3.5–5.2)
ALBUMIN/GLOB SERPL: 1.6 G/DL
ALP SERPL-CCNC: 71 U/L (ref 39–117)
ALT SERPL W P-5'-P-CCNC: 18 U/L (ref 1–41)
ANION GAP SERPL CALCULATED.3IONS-SCNC: 8.1 MMOL/L (ref 5–15)
AST SERPL-CCNC: 17 U/L (ref 1–40)
BILIRUB SERPL-MCNC: 0.6 MG/DL (ref 0–1.2)
BUN SERPL-MCNC: 13 MG/DL (ref 8–23)
BUN/CREAT SERPL: 14.6 (ref 7–25)
CALCIUM SPEC-SCNC: 9.5 MG/DL (ref 8.6–10.5)
CHLORIDE SERPL-SCNC: 105 MMOL/L (ref 98–107)
CHOLEST SERPL-MCNC: 163 MG/DL (ref 0–200)
CO2 SERPL-SCNC: 24.9 MMOL/L (ref 22–29)
CREAT SERPL-MCNC: 0.89 MG/DL (ref 0.76–1.27)
EGFRCR SERPLBLD CKD-EPI 2021: 97.5 ML/MIN/1.73
GLOBULIN UR ELPH-MCNC: 2.6 GM/DL
GLUCOSE SERPL-MCNC: 113 MG/DL (ref 65–99)
HDLC SERPL-MCNC: 40 MG/DL (ref 40–60)
LDLC SERPL CALC-MCNC: 112 MG/DL (ref 0–100)
LDLC/HDLC SERPL: 2.79 {RATIO}
POTASSIUM SERPL-SCNC: 4.6 MMOL/L (ref 3.5–5.2)
PROT SERPL-MCNC: 6.7 G/DL (ref 6–8.5)
SODIUM SERPL-SCNC: 138 MMOL/L (ref 136–145)
TRIGL SERPL-MCNC: 57 MG/DL (ref 0–150)
VLDLC SERPL-MCNC: 11 MG/DL (ref 5–40)

## 2023-08-21 PROCEDURE — 80061 LIPID PANEL: CPT

## 2023-08-21 PROCEDURE — 36415 COLL VENOUS BLD VENIPUNCTURE: CPT

## 2023-08-21 PROCEDURE — 80053 COMPREHEN METABOLIC PANEL: CPT

## 2024-04-24 ENCOUNTER — TRANSCRIBE ORDERS (OUTPATIENT)
Dept: VASCULAR SURGERY | Facility: HOSPITAL | Age: 64
End: 2024-04-24
Payer: OTHER GOVERNMENT

## 2024-04-24 DIAGNOSIS — I73.9 PVD (PERIPHERAL VASCULAR DISEASE): Primary | ICD-10-CM

## 2024-05-10 ENCOUNTER — TRANSCRIBE ORDERS (OUTPATIENT)
Dept: VASCULAR SURGERY | Facility: HOSPITAL | Age: 64
End: 2024-05-10
Payer: OTHER GOVERNMENT

## 2024-05-10 ENCOUNTER — OFFICE VISIT (OUTPATIENT)
Dept: VASCULAR SURGERY | Facility: HOSPITAL | Age: 64
End: 2024-05-10
Payer: OTHER GOVERNMENT

## 2024-05-10 ENCOUNTER — HOSPITAL ENCOUNTER (OUTPATIENT)
Dept: CARDIOLOGY | Facility: HOSPITAL | Age: 64
Discharge: HOME | End: 2024-05-10
Payer: OTHER GOVERNMENT

## 2024-05-10 VITALS
HEART RATE: 75 BPM | SYSTOLIC BLOOD PRESSURE: 173 MMHG | TEMPERATURE: 98 F | HEIGHT: 68 IN | BODY MASS INDEX: 30.31 KG/M2 | DIASTOLIC BLOOD PRESSURE: 101 MMHG | WEIGHT: 200 LBS | OXYGEN SATURATION: 98 % | RESPIRATION RATE: 16 BRPM

## 2024-05-10 DIAGNOSIS — I73.9 PVD (PERIPHERAL VASCULAR DISEASE): Primary | ICD-10-CM

## 2024-05-10 DIAGNOSIS — I73.9 PAD (PERIPHERAL ARTERY DISEASE): Primary | ICD-10-CM

## 2024-05-10 DIAGNOSIS — I73.9 PVD (PERIPHERAL VASCULAR DISEASE): ICD-10-CM

## 2024-05-10 LAB
BH CV GRAFT BRACHIAL PRESSURE LEFT: 173 MMHG
BH CV GRAFT BRACHIAL PRESSURE RIGHT: 157 MMHG
BH CV LEA LEFT DPA PRESSURE: 192 MMHG
BH CV LEA LEFT PTA PRESSURE: 177 MMHG
BH CV LEA RIGHT ANT TIBIAL A DISTAL EDV: 10.5 CM/S
BH CV LEA RIGHT ANT TIBIAL A DISTAL PSV: 32.2 CM/S
BH CV LEA RIGHT ANT TIBIAL A MID EDV: 8.77 CM/S
BH CV LEA RIGHT ANT TIBIAL A MID PSV: 31 CM/S
BH CV LEA RIGHT ANT TIBIAL A PROX EDV: 25.2 CM/S
BH CV LEA RIGHT ANT TIBIAL A PROX PSV: 236 CM/S
BH CV LEA RIGHT CFA DISTAL EDV: 17.5 CM/S
BH CV LEA RIGHT CFA DISTAL PSV: 111 CM/S
BH CV LEA RIGHT CFA PROX EDV: 13.4 CM/S
BH CV LEA RIGHT CFA PROX PSV: 162 CM/S
BH CV LEA RIGHT DFA PROX EDV: 13 CM/S
BH CV LEA RIGHT DFA PROX PSV: 144 CM/S
BH CV LEA RIGHT DPA PRESSURE: 156 MMHG
BH CV LEA RIGHT PERONEAL  DISTAL EDV: 0 CM/S
BH CV LEA RIGHT PERONEAL  DISTAL PSV: 25 CM/S
BH CV LEA RIGHT PERONEAL  MID EDV: 0 CM/S
BH CV LEA RIGHT PERONEAL  MID PSV: 17.5 CM/S
BH CV LEA RIGHT PERONEAL  PROX EDV: 5.54 CM/S
BH CV LEA RIGHT PERONEAL  PROX PSV: 15.3 CM/S
BH CV LEA RIGHT POPITEAL A  DISTAL EDV: 0 CM/S
BH CV LEA RIGHT POPITEAL A  DISTAL PSV: 38.8 CM/S
BH CV LEA RIGHT POPITEAL A  MID EDV: 0 CM/S
BH CV LEA RIGHT POPITEAL A  MID PSV: 42.8 CM/S
BH CV LEA RIGHT POPITEAL A  PROX EDV: 15.4 CM/S
BH CV LEA RIGHT POPITEAL A  PROX PSV: 68 CM/S
BH CV LEA RIGHT PTA DISTAL EDV: 14.8 CM/S
BH CV LEA RIGHT PTA DISTAL PSV: 47.2 CM/S
BH CV LEA RIGHT PTA MID EDV: 10.7 CM/S
BH CV LEA RIGHT PTA MID PSV: 34.1 CM/S
BH CV LEA RIGHT PTA PRESSURE: 144 MMHG
BH CV LEA RIGHT PTA PROX EDV: 17.5 CM/S
BH CV LEA RIGHT PTA PROX PSV: 53.2 CM/S
BH CV LEA RIGHT SFA DISTAL EDV: 11.2 CM/S
BH CV LEA RIGHT SFA DISTAL PSV: 56 CM/S
BH CV LEA RIGHT SFA MID EDV: 10.4 CM/S
BH CV LEA RIGHT SFA MID PSV: 76.3 CM/S
BH CV LEA RIGHT SFA PROX EDV: 12.1 CM/S
BH CV LEA RIGHT SFA PROX PSV: 112 CM/S
BH CV LEA RIGHT TIBEOPERONEAL EDV: 9.33 CM/S
BH CV LEA RIGHT TIBEOPERONEAL PSV: 29.1 CM/S
BH CV LOWER ARTERIAL LEFT ABI RATIO: 1.11
BH CV LOWER ARTERIAL RIGHT ABI RATIO: 0.99

## 2024-05-10 PROCEDURE — 93926 LOWER EXTREMITY STUDY: CPT

## 2024-05-13 ENCOUNTER — OFFICE VISIT (OUTPATIENT)
Dept: VASCULAR SURGERY | Facility: HOSPITAL | Age: 64
End: 2024-05-13
Payer: OTHER GOVERNMENT

## 2024-05-13 VITALS
HEART RATE: 89 BPM | SYSTOLIC BLOOD PRESSURE: 162 MMHG | TEMPERATURE: 98.2 F | RESPIRATION RATE: 18 BRPM | OXYGEN SATURATION: 96 % | DIASTOLIC BLOOD PRESSURE: 74 MMHG

## 2024-05-13 DIAGNOSIS — I73.9 CLAUDICATION: Primary | ICD-10-CM

## 2024-05-13 PROCEDURE — G0463 HOSPITAL OUTPT CLINIC VISIT: HCPCS | Performed by: SURGERY

## 2024-05-13 NOTE — H&P (VIEW-ONLY)
[facility]   HISTORY AND PHYSICAL    Patient Name: Rafael Cee  : 1960  MRN: [id number]  Primary Care Physician:  Aga Anderson PA-C  Date of admission: (Not on file)    Subjective   Subjective     Chief Complaint: Right leg pain with ambulation    HPI:    Rafael Cee is a 64 y.o. male with a longstanding history of right leg pain with ambulation.  He has significant pain if he walks 15 steps.  If he is carrying something it is even worse.  The right leg feels tight and swollen even though it does look normal.  Starts at the knee level and goes down.    Review of Systems    Non contributory except for the History of Present Illness    Personal History     Past Medical History:   Diagnosis Date    Hyperlipidemia     Hypertension        Past Surgical History:   Procedure Laterality Date    CARDIAC CATHETERIZATION Left 2022    Procedure: Left leg angiogram, possible angioplasty or stenting;  Surgeon: Dakotah Barrios MD;  Location: [facility] CATH INVASIVE LOCATION;  Service: Vascular;  Laterality: Left;       Family History: family history includes Cancer in his mother and sister; Heart attack in his father. Otherwise pertinent FHx was reviewed and not pertinent to current issue.    Social History:  reports that he has been smoking cigarettes. He has never used smokeless tobacco. He reports current alcohol use of about 3.0 standard drinks of alcohol per week. He reports that he does not use drugs.    Home Medications:  Current Outpatient Medications on File Prior to Visit   Medication Sig    acetaminophen-codeine (TYLENOL #3) 300-30 MG per tablet Take 1 tablet by mouth Every 6 (Six) Hours As Needed for Moderate Pain.    buPROPion SR (WELLBUTRIN SR) 150 MG 12 hr tablet Take 1 tablet by mouth 2 (Two) Times a Day.    clopidogrel (PLAVIX) 75 MG tablet Take 1 tablet by mouth Daily.    gabapentin (NEURONTIN) 300 MG capsule Take 1 capsule by mouth 3 (Three) Times a Day.    lisinopril (PRINIVIL,ZESTRIL) 5  MG tablet Take 2 tablets by mouth Daily.    rosuvastatin (CRESTOR) 20 MG tablet Take 1 tablet by mouth Daily.    vitamin D (ERGOCALCIFEROL) 1.25 MG (56469 UT) capsule capsule Take 1 capsule by mouth 1 (One) Time Per Week.     No current facility-administered medications on file prior to visit.          Allergies:  Allergies   Allergen Reactions    Aspirin Anaphylaxis     unsure    Ibuprofen Anaphylaxis    Nsaids Anaphylaxis       Objective   Objective     Vitals:   Temp:  [98.2 °F (36.8 °C)] 98.2 °F (36.8 °C)  Heart Rate:  [89] 89  Resp:  [18] 18  BP: (162)/(74) 162/74    Physical Exam    General: Awake, alert, NAD   Eyes:  CRYSTAL   Neck: Supple   Lungs: Clear   Heart: RRR   Abdomen: benign   Musculoskeletal:  normal motor tone, symmetric   Skin: warm, normal turgor   Neuro: strength 5/5 all extremities   Pulses: +2 right pedal pulses.    Diagnostic studies:   An arterial Doppler dated 5/10/2024 demonstrates a right JOSE of 0.99, the left JOSE is 1.11.  On the right side there is an increase proximal MARISOL velocity.  The mid distal SFA velocities appear to be reduced.    Assessment & Plan   Assessment / Plan     Active Hospital Problems:  There are no active hospital problems to display for this patient.      Diagnoses and all orders for this visit:    1. Claudication (Primary)  -     Case Request; Standing  -     ceFAZolin 2000 mg IVPB in 100 mL NS (VTB)  -     Case Request    Other orders  -     Follow Anesthesia Guidelines / Protocol; Future  -     Follow Anesthesia Guidelines / Protocol; Standing  -     Verify NPO Status; Standing  -     Obtain Informed Consent; Standing  -     CBC & Differential; Standing  -     Basic Metabolic Panel; Standing  -     Insert Peripheral IV; Standing  -     Saline Lock & Maintain IV Access; Standing  -     VTE Prophylaxis Not Indicated: Other: Patient Currently Anticoagulated / Receiving Prophylaxis; Standing        Assessment/plan:   Mr. Cee is describing and experiencing symptoms  consistent with intermittent claudication.  Plan angiography with possible endovascular intervention.  I have discussed with the patient in detail the mechanics of the procedure, the indications, benefits, risks, alternatives as well as potential complications to include but not limited to infection, bleeding, inability to cross the occlusion, vascular injury requiring surgical repair.  He appears to understand and desires to proceed.      Electronically signed by Dakotah Barrios MD, 05/13/24, 8:49 AM EDT.

## 2024-05-13 NOTE — PROGRESS NOTES
[facility]   HISTORY AND PHYSICAL    Patient Name: Rafael Cee  : 1960  MRN: [id number]  Primary Care Physician:  Aga Anderson PA-C  Date of admission: (Not on file)    Subjective   Subjective     Chief Complaint: Right leg pain with ambulation    HPI:    Rafael Cee is a 64 y.o. male with a longstanding history of right leg pain with ambulation.  He has significant pain if he walks 15 steps.  If he is carrying something it is even worse.  The right leg feels tight and swollen even though it does look normal.  Starts at the knee level and goes down.    Review of Systems    Non contributory except for the History of Present Illness    Personal History     Past Medical History:   Diagnosis Date    Hyperlipidemia     Hypertension        Past Surgical History:   Procedure Laterality Date    CARDIAC CATHETERIZATION Left 2022    Procedure: Left leg angiogram, possible angioplasty or stenting;  Surgeon: Dakotah Barrios MD;  Location: [facility] CATH INVASIVE LOCATION;  Service: Vascular;  Laterality: Left;       Family History: family history includes Cancer in his mother and sister; Heart attack in his father. Otherwise pertinent FHx was reviewed and not pertinent to current issue.    Social History:  reports that he has been smoking cigarettes. He has never used smokeless tobacco. He reports current alcohol use of about 3.0 standard drinks of alcohol per week. He reports that he does not use drugs.    Home Medications:  Current Outpatient Medications on File Prior to Visit   Medication Sig    acetaminophen-codeine (TYLENOL #3) 300-30 MG per tablet Take 1 tablet by mouth Every 6 (Six) Hours As Needed for Moderate Pain.    buPROPion SR (WELLBUTRIN SR) 150 MG 12 hr tablet Take 1 tablet by mouth 2 (Two) Times a Day.    clopidogrel (PLAVIX) 75 MG tablet Take 1 tablet by mouth Daily.    gabapentin (NEURONTIN) 300 MG capsule Take 1 capsule by mouth 3 (Three) Times a Day.    lisinopril (PRINIVIL,ZESTRIL) 5  MG tablet Take 2 tablets by mouth Daily.    rosuvastatin (CRESTOR) 20 MG tablet Take 1 tablet by mouth Daily.    vitamin D (ERGOCALCIFEROL) 1.25 MG (30405 UT) capsule capsule Take 1 capsule by mouth 1 (One) Time Per Week.     No current facility-administered medications on file prior to visit.          Allergies:  Allergies   Allergen Reactions    Aspirin Anaphylaxis     unsure    Ibuprofen Anaphylaxis    Nsaids Anaphylaxis       Objective   Objective     Vitals:   Temp:  [98.2 °F (36.8 °C)] 98.2 °F (36.8 °C)  Heart Rate:  [89] 89  Resp:  [18] 18  BP: (162)/(74) 162/74    Physical Exam    General: Awake, alert, NAD   Eyes:  CRYSTAL   Neck: Supple   Lungs: Clear   Heart: RRR   Abdomen: benign   Musculoskeletal:  normal motor tone, symmetric   Skin: warm, normal turgor   Neuro: strength 5/5 all extremities   Pulses: +2 right pedal pulses.    Diagnostic studies:   An arterial Doppler dated 5/10/2024 demonstrates a right JOSE of 0.99, the left JOSE is 1.11.  On the right side there is an increase proximal MARISOL velocity.  The mid distal SFA velocities appear to be reduced.    Assessment & Plan   Assessment / Plan     Active Hospital Problems:  There are no active hospital problems to display for this patient.      Diagnoses and all orders for this visit:    1. Claudication (Primary)  -     Case Request; Standing  -     ceFAZolin 2000 mg IVPB in 100 mL NS (VTB)  -     Case Request    Other orders  -     Follow Anesthesia Guidelines / Protocol; Future  -     Follow Anesthesia Guidelines / Protocol; Standing  -     Verify NPO Status; Standing  -     Obtain Informed Consent; Standing  -     CBC & Differential; Standing  -     Basic Metabolic Panel; Standing  -     Insert Peripheral IV; Standing  -     Saline Lock & Maintain IV Access; Standing  -     VTE Prophylaxis Not Indicated: Other: Patient Currently Anticoagulated / Receiving Prophylaxis; Standing        Assessment/plan:   Mr. Cee is describing and experiencing symptoms  consistent with intermittent claudication.  Plan angiography with possible endovascular intervention.  I have discussed with the patient in detail the mechanics of the procedure, the indications, benefits, risks, alternatives as well as potential complications to include but not limited to infection, bleeding, inability to cross the occlusion, vascular injury requiring surgical repair.  He appears to understand and desires to proceed.      Electronically signed by Dakotah Barrios MD, 05/13/24, 8:49 AM EDT.

## 2024-05-23 ENCOUNTER — HOSPITAL ENCOUNTER (OUTPATIENT)
Facility: HOSPITAL | Age: 64
Setting detail: HOSPITAL OUTPATIENT SURGERY
Discharge: HOME | End: 2024-05-23
Attending: SURGERY | Admitting: SURGERY
Payer: OTHER GOVERNMENT

## 2024-05-23 VITALS
WEIGHT: 199.3 LBS | HEART RATE: 77 BPM | BODY MASS INDEX: 30.2 KG/M2 | TEMPERATURE: 98.3 F | OXYGEN SATURATION: 98 % | HEIGHT: 68 IN | DIASTOLIC BLOOD PRESSURE: 88 MMHG | RESPIRATION RATE: 16 BRPM | SYSTOLIC BLOOD PRESSURE: 148 MMHG

## 2024-05-23 DIAGNOSIS — I73.9 PAD (PERIPHERAL ARTERY DISEASE): Primary | ICD-10-CM

## 2024-05-23 DIAGNOSIS — I73.9 CLAUDICATION: ICD-10-CM

## 2024-05-23 DIAGNOSIS — R09.1 PLEURISY: ICD-10-CM

## 2024-05-23 LAB
ANION GAP SERPL CALCULATED.3IONS-SCNC: 9.1 MMOL/L (ref 5–15)
BASOPHILS # BLD AUTO: 0.05 10*3/MM3 (ref 0–0.2)
BASOPHILS NFR BLD AUTO: 0.7 % (ref 0–1.5)
BUN SERPL-MCNC: 15 MG/DL (ref 8–23)
BUN/CREAT SERPL: 18.1 (ref 7–25)
CALCIUM SPEC-SCNC: 8.8 MG/DL (ref 8.6–10.5)
CHLORIDE SERPL-SCNC: 105 MMOL/L (ref 98–107)
CO2 SERPL-SCNC: 23.9 MMOL/L (ref 22–29)
CREAT SERPL-MCNC: 0.83 MG/DL (ref 0.76–1.27)
DEPRECATED RDW RBC AUTO: 37.4 FL (ref 37–54)
EGFRCR SERPLBLD CKD-EPI 2021: 99 ML/MIN/1.73
EOSINOPHIL # BLD AUTO: 0.28 10*3/MM3 (ref 0–0.4)
EOSINOPHIL NFR BLD AUTO: 4.1 % (ref 0.3–6.2)
ERYTHROCYTE [DISTWIDTH] IN BLOOD BY AUTOMATED COUNT: 12.5 % (ref 12.3–15.4)
GLUCOSE SERPL-MCNC: 138 MG/DL (ref 65–99)
HCT VFR BLD AUTO: 42.4 % (ref 37.5–51)
HGB BLD-MCNC: 14.6 G/DL (ref 13–17.7)
IMM GRANULOCYTES # BLD AUTO: 0.02 10*3/MM3 (ref 0–0.05)
IMM GRANULOCYTES NFR BLD AUTO: 0.3 % (ref 0–0.5)
LYMPHOCYTES # BLD AUTO: 1.66 10*3/MM3 (ref 0.7–3.1)
LYMPHOCYTES NFR BLD AUTO: 24.6 % (ref 19.6–45.3)
MCH RBC QN AUTO: 28.9 PG (ref 26.6–33)
MCHC RBC AUTO-ENTMCNC: 34.4 G/DL (ref 31.5–35.7)
MCV RBC AUTO: 84 FL (ref 79–97)
MONOCYTES # BLD AUTO: 0.56 10*3/MM3 (ref 0.1–0.9)
MONOCYTES NFR BLD AUTO: 8.3 % (ref 5–12)
NEUTROPHILS NFR BLD AUTO: 4.19 10*3/MM3 (ref 1.7–7)
NEUTROPHILS NFR BLD AUTO: 62 % (ref 42.7–76)
NRBC BLD AUTO-RTO: 0 /100 WBC (ref 0–0.2)
PLATELET # BLD AUTO: 190 10*3/MM3 (ref 140–450)
PMV BLD AUTO: 10.5 FL (ref 6–12)
POTASSIUM SERPL-SCNC: 4.3 MMOL/L (ref 3.5–5.2)
RBC # BLD AUTO: 5.05 10*6/MM3 (ref 4.14–5.8)
SODIUM SERPL-SCNC: 138 MMOL/L (ref 136–145)
WBC NRBC COR # BLD AUTO: 6.76 10*3/MM3 (ref 3.4–10.8)

## 2024-05-23 PROCEDURE — 99152 MOD SED SAME PHYS/QHP 5/>YRS: CPT | Performed by: SURGERY

## 2024-05-23 PROCEDURE — C1725 CATH, TRANSLUMIN NON-LASER: HCPCS | Performed by: SURGERY

## 2024-05-23 PROCEDURE — 37232 PR REVSC OPN/PRQ TIB/PERO W/ANGIOPLASTY UNI EA VSL: CPT | Performed by: SURGERY

## 2024-05-23 PROCEDURE — C1894 INTRO/SHEATH, NON-LASER: HCPCS | Performed by: SURGERY

## 2024-05-23 PROCEDURE — 99153 MOD SED SAME PHYS/QHP EA: CPT | Performed by: SURGERY

## 2024-05-23 PROCEDURE — 25010000002 CEFAZOLIN PER 500 MG: Performed by: SURGERY

## 2024-05-23 PROCEDURE — C1887 CATHETER, GUIDING: HCPCS | Performed by: SURGERY

## 2024-05-23 PROCEDURE — C1769 GUIDE WIRE: HCPCS | Performed by: SURGERY

## 2024-05-23 PROCEDURE — 37226 PR REVSC OPN/PRQ FEM/POP W/STNT/ANGIOP SM VSL: CPT | Performed by: SURGERY

## 2024-05-23 PROCEDURE — 37228 PR REVSC OPN/PRQ TIB/PERO W/ANGIOPLASTY UNI: CPT | Performed by: SURGERY

## 2024-05-23 PROCEDURE — 76937 US GUIDE VASCULAR ACCESS: CPT | Performed by: SURGERY

## 2024-05-23 PROCEDURE — 75710 ARTERY X-RAYS ARM/LEG: CPT | Performed by: SURGERY

## 2024-05-23 PROCEDURE — 25010000002 MIDAZOLAM PER 1MG: Performed by: SURGERY

## 2024-05-23 PROCEDURE — 80048 BASIC METABOLIC PNL TOTAL CA: CPT | Performed by: SURGERY

## 2024-05-23 PROCEDURE — 75625 CONTRAST EXAM ABDOMINL AORTA: CPT | Performed by: SURGERY

## 2024-05-23 PROCEDURE — 85025 COMPLETE CBC W/AUTO DIFF WBC: CPT | Performed by: SURGERY

## 2024-05-23 PROCEDURE — C1874 STENT, COATED/COV W/DEL SYS: HCPCS | Performed by: SURGERY

## 2024-05-23 PROCEDURE — 25810000003 SODIUM CHLORIDE 0.9 % SOLUTION: Performed by: SURGERY

## 2024-05-23 PROCEDURE — 0 IODIXANOL PER 1 ML: Performed by: SURGERY

## 2024-05-23 PROCEDURE — 25010000002 HEPARIN (PORCINE) PER 1000 UNITS: Performed by: SURGERY

## 2024-05-23 PROCEDURE — C1760 CLOSURE DEV, VASC: HCPCS | Performed by: SURGERY

## 2024-05-23 PROCEDURE — 25010000002 FENTANYL CITRATE (PF) 100 MCG/2ML SOLUTION: Performed by: SURGERY

## 2024-05-23 DEVICE — STNT PERIPH ZILVER PTX 6F 6X60MM 125CM BX/1: Type: IMPLANTABLE DEVICE | Status: FUNCTIONAL

## 2024-05-23 RX ORDER — SODIUM CHLORIDE 9 MG/ML
INJECTION, SOLUTION INTRAVENOUS
Status: COMPLETED | OUTPATIENT
Start: 2024-05-23 | End: 2024-05-23

## 2024-05-23 RX ORDER — SODIUM CHLORIDE 9 MG/ML
100 INJECTION, SOLUTION INTRAVENOUS CONTINUOUS
Status: CANCELLED | OUTPATIENT
Start: 2024-05-23 | End: 2024-05-23

## 2024-05-23 RX ORDER — ACETAMINOPHEN AND CODEINE PHOSPHATE 300; 30 MG/1; MG/1
1 TABLET ORAL EVERY 4 HOURS PRN
Qty: 12 TABLET | Refills: 0 | Status: SHIPPED | OUTPATIENT
Start: 2024-05-23

## 2024-05-23 RX ORDER — LIDOCAINE HYDROCHLORIDE 20 MG/ML
INJECTION, SOLUTION INFILTRATION; PERINEURAL
Status: DISCONTINUED | OUTPATIENT
Start: 2024-05-23 | End: 2024-05-23 | Stop reason: HOSPADM

## 2024-05-23 RX ORDER — IODIXANOL 320 MG/ML
INJECTION, SOLUTION INTRAVASCULAR
Status: DISCONTINUED | OUTPATIENT
Start: 2024-05-23 | End: 2024-05-23 | Stop reason: HOSPADM

## 2024-05-23 RX ORDER — MIDAZOLAM HYDROCHLORIDE 2 MG/2ML
INJECTION, SOLUTION INTRAMUSCULAR; INTRAVENOUS
Status: DISCONTINUED | OUTPATIENT
Start: 2024-05-23 | End: 2024-05-23 | Stop reason: HOSPADM

## 2024-05-23 RX ORDER — FENTANYL CITRATE 50 UG/ML
INJECTION, SOLUTION INTRAMUSCULAR; INTRAVENOUS
Status: DISCONTINUED | OUTPATIENT
Start: 2024-05-23 | End: 2024-05-23 | Stop reason: HOSPADM

## 2024-05-23 RX ORDER — CLOPIDOGREL BISULFATE 75 MG/1
TABLET ORAL
Status: DISCONTINUED | OUTPATIENT
Start: 2024-05-23 | End: 2024-05-23 | Stop reason: HOSPADM

## 2024-05-23 RX ORDER — HEPARIN SODIUM 1000 [USP'U]/ML
INJECTION, SOLUTION INTRAVENOUS; SUBCUTANEOUS
Status: DISCONTINUED | OUTPATIENT
Start: 2024-05-23 | End: 2024-05-23 | Stop reason: HOSPADM

## 2024-05-23 RX ADMIN — SODIUM CHLORIDE 2000 MG: 9 INJECTION, SOLUTION INTRAVENOUS at 08:16

## 2024-05-23 NOTE — PROGRESS NOTES
Angiogram performed.  Nearly occluded popliteal as well as proximal anterior tibial and tibioperoneal trunks, angioplastied with excellent results for the popliteal and tibioperoneal trunk.  Residual stenosis at the origin of the anterior tibial.  Moderate focal stenosis at the level of Good's canal in the superficial femoral artery, angioplastied and stented to 6 mm with excellent results.  For details find full report under chart review, cardiology tab.

## 2024-05-23 NOTE — DISCHARGE INSTRUCTIONS
DISCHARGE INSTRUCTIONS  VASCULAR  PROCEDURES      For your surgery you had:  IV sedation.     You may experience dizziness, drowsiness, or light-headedness for several hours following surgery/procedure.  Do not stay alone today or tonight.  Limit your activity for 24 hours.  Resume your diet slowly.  Follow whatever special dietary instructions you may have been given by your doctor.  You should not drive or operate machinery, drink alcohol, or sign legally binding documents for 24 hours or while you are taking pain medication.    NOTIFY YOUR DOCTOR IF YOU EXPERIENCE ANY OF THE FOLLOWING:  Temperature greater than 101 degrees Fahrenheit  Shaking Chills  Redness or excessive drainage from incision  Nausea, vomiting and/or pain that is not controlled by prescribed medications  Increase in bleeding or bleeding that is excessive  Unable to urinate in 6 hours after surgery  If unable to reach your doctor, please go to the closest Emergency Room  May remove dressing: in 24 hours  No heavy lifting greater than 10-15lbs for 3 days.  You may shower tomorrow, no submerging of incision for 2 weeks. (Pat site dry only)      Medications per physician instructions as indicated on After Visit Summary.    Last dose of pain medication was given at:  NONE .    [x] [facility] VASCULAR CLOSURE DEVICE DISCHARGE INSTRUCTIONS:  Apply band-aid daily if needed for oozing or moisture, try to keep area clean and dry until scab has formed on site.  NO driving for 24 hours, avoid driving unless necessary and go slowly.  NO strenuous activity, exercise, pushing or pulling.  No heavy lifting greater than 10-15lbs. for 3 days.  Avoid stairs, if necessary go slowly.  While coughing, sneezing, or straining for bowel movement, apply pressure with palm of hand to site for 24 hours.  MD to release for work or sexual activity.      SPECIAL INSTRUCTIONS:    May remove dressing in 1 day and wash area.  Follow-up in the office in 2 weeks, call for  appointment.  Resume home diet.  Resume home medications.  No lifting greater than 15 pounds for 3 days.  Resume and continue clopidogrel beginning Friday, 5/24/2024.  Very important to take daily for at least the next 60 days.

## 2024-06-12 ENCOUNTER — OFFICE VISIT (OUTPATIENT)
Dept: VASCULAR SURGERY | Facility: HOSPITAL | Age: 64
End: 2024-06-12
Payer: OTHER GOVERNMENT

## 2024-06-12 ENCOUNTER — TRANSCRIBE ORDERS (OUTPATIENT)
Dept: VASCULAR SURGERY | Facility: HOSPITAL | Age: 64
End: 2024-06-12
Payer: OTHER GOVERNMENT

## 2024-06-12 VITALS
SYSTOLIC BLOOD PRESSURE: 144 MMHG | TEMPERATURE: 97.5 F | RESPIRATION RATE: 18 BRPM | DIASTOLIC BLOOD PRESSURE: 88 MMHG | OXYGEN SATURATION: 99 % | HEART RATE: 72 BPM

## 2024-06-12 DIAGNOSIS — I70.219 ATHEROSCLEROSIS OF LOWER EXTREMITY WITH CLAUDICATION: Primary | ICD-10-CM

## 2024-06-12 DIAGNOSIS — Z98.890 S/P ANGIOGRAM OF EXTREMITY: ICD-10-CM

## 2024-06-12 PROCEDURE — 99212 OFFICE O/P EST SF 10 MIN: CPT | Performed by: NURSE PRACTITIONER

## 2024-06-12 PROCEDURE — G0463 HOSPITAL OUTPT CLINIC VISIT: HCPCS | Performed by: NURSE PRACTITIONER

## 2024-06-12 NOTE — PROGRESS NOTES
[facility]     Progress Note    Patient Name: Rafael Cee  : 1960  MRN: [id number]  Primary Care Physician:  Aga Anderson PA-C  Date of admission: (Not on file)  Chief Complaint:    Chief Complaint   Patient presents with    Follow-up     Patient is here as a post op visit. Patient is s/p angio. Patient states symptoms have significantly improved.        Subjective   Subjective     Rafael Cee is a 64 y.o. male presents for follow-up after a angiogram performed Dr. Barrios on 2024, he angioplasty the proximal anterior tibial and tibioperoneal trunks along with angioplasty and stenting of the SFA for lifestyle limiting intermittent claudication.  He states his symptoms have greatly improved, he is able to push mow his lawn and was able to walk a mile.  He is working on smoking cessation.  He is taking Plavix and a statin medication daily.    Objective   Objective     Vitals:   Temp:  [97.5 °F (36.4 °C)] 97.5 °F (36.4 °C)  Heart Rate:  [72] 72  Resp:  [18] 18  BP: (144)/(88) 144/88    Physical Exam   General: Alert, no acute distress  Extremities: Symmetrical  Neuro: No gross deficits    Result Review    Result Review:  I have personally reviewed the results from the time of this admission to 2024 08:40 EDT and agree with these findings:  []  Laboratory  []  Microbiology  []  Radiology  []  EKG/Telemetry   []  Cardiology/Vascular   []  Pathology  []  Old records  []  Other:    Most notable findings include:     Assessment & Plan   Assessment / Plan     Assessment/Plan:  Diagnoses and all orders for this visit:    1. Atherosclerosis of lower extremity with claudication (Primary)  -     Duplex Lower Extremity Art / Grafts - Bilateral CAR; Future    2. S/P angiogram of extremity  -     Duplex Lower Extremity Art / Grafts - Bilateral CAR; Future    Mr. Cee is doing well following endovascular intervention performed by Dr. Barrios on 2024.  I recommended follow-up in 3 months with bilateral  lower extremity duplex. He should continue the plavix as prescribed by Dr. Barrios.     I have answered all of his questions and he is in agreement with the plan at this time.  Thank you for allowing me to participate in your patient's care.      Active Hospital Problems:  There are no active hospital problems to display for this patient.          Electronically signed by KATY Squires, 06/12/24, 8:24 AM EDT.

## 2024-12-27 PROCEDURE — 87070 CULTURE OTHR SPECIMN AEROBIC: CPT

## 2024-12-27 PROCEDURE — 87205 SMEAR GRAM STAIN: CPT

## 2025-01-13 ENCOUNTER — OFFICE VISIT (OUTPATIENT)
Dept: SURGERY | Facility: CLINIC | Age: 65
End: 2025-01-13
Payer: OTHER GOVERNMENT

## 2025-01-13 VITALS — RESPIRATION RATE: 16 BRPM | HEIGHT: 68 IN | WEIGHT: 198.41 LBS | BODY MASS INDEX: 30.07 KG/M2

## 2025-01-13 DIAGNOSIS — D23.5 DERMOID CYST OF SKIN OF BACK: Primary | ICD-10-CM

## 2025-01-13 PROCEDURE — 99203 OFFICE O/P NEW LOW 30 MIN: CPT | Performed by: SURGERY

## 2025-01-13 RX ORDER — SODIUM CHLORIDE 0.9 % (FLUSH) 0.9 %
10 SYRINGE (ML) INJECTION EVERY 12 HOURS SCHEDULED
OUTPATIENT
Start: 2025-01-13

## 2025-01-13 RX ORDER — SODIUM CHLORIDE 9 MG/ML
40 INJECTION, SOLUTION INTRAVENOUS AS NEEDED
OUTPATIENT
Start: 2025-01-13

## 2025-01-13 RX ORDER — ONDANSETRON 2 MG/ML
4 INJECTION INTRAMUSCULAR; INTRAVENOUS EVERY 6 HOURS PRN
OUTPATIENT
Start: 2025-01-13

## 2025-01-13 RX ORDER — SODIUM CHLORIDE 0.9 % (FLUSH) 0.9 %
10 SYRINGE (ML) INJECTION AS NEEDED
OUTPATIENT
Start: 2025-01-13

## 2025-01-13 NOTE — PROGRESS NOTES
"Inpatient History and Physical Surgical Orders    Preadmission Location:   Preadmission Time:  Facility:  Surgery Date:  Surgery Time:  Preadmission Test date:     Chief Complaint  Outpatient History and Physical / Surgical Orders    Primary Care Provider: Aga Anderson PA-C    Referring Provider: Sukumar Bahena, *    Subjective      Patient Name: Rafael Cee : 1960    HPI  The patient is a 64-year-old gentleman that presents with a sebaceous cyst of the mid back.  He has had a skin cyst there for some time but it recently got larger and became infected.    Past History:  Medical History: has a past medical history of Hyperlipidemia and Hypertension.   Surgical History: has a past surgical history that includes Cardiac catheterization (Left, 2022) and Cardiac catheterization (Right, 2024).   Family History: family history includes Cancer in his mother and sister; Heart attack in his father.   Social History: reports that he has been smoking cigarettes. He has been exposed to tobacco smoke. He has never used smokeless tobacco. He reports current alcohol use of about 3.0 standard drinks of alcohol per week. He reports that he does not use drugs.  Allergies: Aspirin, Ibuprofen, and Nsaids       Current Outpatient Medications:     clopidogrel (PLAVIX) 75 MG tablet, Take 1 tablet by mouth Daily., Disp: , Rfl:     lisinopril (PRINIVIL,ZESTRIL) 10 MG tablet, Take 1 tablet by mouth 2 (Two) Times a Day., Disp: , Rfl:     mupirocin (BACTROBAN) 2 % ointment, Apply 1 Application topically to the appropriate area as directed 3 (Three) Times a Day., Disp: 1 g, Rfl: 0    rosuvastatin (CRESTOR) 20 MG tablet, Take 1 tablet by mouth Daily., Disp: , Rfl:     vitamin D (ERGOCALCIFEROL) 1.25 MG (92377 UT) capsule capsule, Take 1 capsule by mouth 1 (One) Time Per Week., Disp: , Rfl:        Objective   Vital Signs:   Resp 16   Ht 172.7 cm (67.99\")   Wt 90 kg (198 lb 6.6 oz)   BMI 30.18 kg/m²       Physical " Exam  Vitals and nursing note reviewed.   Constitutional:       Appearance: Normal appearance. The patient is well-developed.   Cardiovascular:      Rate and Rhythm: Normal rate and regular rhythm.   Pulmonary:      Effort: Pulmonary effort is normal.      Breath sounds: Normal air entry.   Abdominal:      General: Bowel sounds are normal.      Palpations: Abdomen is soft.      Skin:     General: Skin is warm and dry.   Neurological:      Mental Status: The patient is alert and oriented to person, place, and time.      Motor: Motor function is intact.   Psychiatric:         Mood and Affect: Mood normal.   Back: He has a cyst in the mid back that is perhaps 3 to 4 cm in greatest diameter.    Result Review :               Assessment and Plan   Diagnoses and all orders for this visit:    1. Dermoid cyst of skin of back (Primary)  -     Case Request; Standing  -     Follow Anesthesia Guidelines / Protocol; Future  -     Follow Anesthesia Guidelines / Protocol; Standing  -     Verify NPO Status; Standing  -     Verify / Perform Chlorhexidine Skin Prep; Standing  -     Insert Peripheral IV; Standing  -     Saline Lock & Maintain IV Access; Standing  -     sodium chloride 0.9 % flush 10 mL  -     sodium chloride 0.9 % flush 10 mL  -     sodium chloride 0.9 % infusion 40 mL  -     Place Sequential Compression Device; Standing  -     Maintain Sequential Compression Device; Standing  -     ondansetron (ZOFRAN) injection 4 mg  -     ceFAZolin (ANCEF) 2,000 mg in sodium chloride 0.9 % 100 mL IVPB  -     Case Request    We will schedule him for an excision of this back cyst in the operating room.  I have described that procedure to him as well as the risk and benefits and he is agreeable to proceeding.    I  Josse Layne MD  01/13/2025

## 2025-01-13 NOTE — H&P (VIEW-ONLY)
"Inpatient History and Physical Surgical Orders    Preadmission Location:   Preadmission Time:  Facility:  Surgery Date:  Surgery Time:  Preadmission Test date:     Chief Complaint  Outpatient History and Physical / Surgical Orders    Primary Care Provider: Aga Anderson PA-C    Referring Provider: Sukumar Bahena, *    Subjective      Patient Name: Rafael Cee : 1960    HPI  The patient is a 64-year-old gentleman that presents with a sebaceous cyst of the mid back.  He has had a skin cyst there for some time but it recently got larger and became infected.    Past History:  Medical History: has a past medical history of Hyperlipidemia and Hypertension.   Surgical History: has a past surgical history that includes Cardiac catheterization (Left, 2022) and Cardiac catheterization (Right, 2024).   Family History: family history includes Cancer in his mother and sister; Heart attack in his father.   Social History: reports that he has been smoking cigarettes. He has been exposed to tobacco smoke. He has never used smokeless tobacco. He reports current alcohol use of about 3.0 standard drinks of alcohol per week. He reports that he does not use drugs.  Allergies: Aspirin, Ibuprofen, and Nsaids       Current Outpatient Medications:     clopidogrel (PLAVIX) 75 MG tablet, Take 1 tablet by mouth Daily., Disp: , Rfl:     lisinopril (PRINIVIL,ZESTRIL) 10 MG tablet, Take 1 tablet by mouth 2 (Two) Times a Day., Disp: , Rfl:     mupirocin (BACTROBAN) 2 % ointment, Apply 1 Application topically to the appropriate area as directed 3 (Three) Times a Day., Disp: 1 g, Rfl: 0    rosuvastatin (CRESTOR) 20 MG tablet, Take 1 tablet by mouth Daily., Disp: , Rfl:     vitamin D (ERGOCALCIFEROL) 1.25 MG (84416 UT) capsule capsule, Take 1 capsule by mouth 1 (One) Time Per Week., Disp: , Rfl:        Objective   Vital Signs:   Resp 16   Ht 172.7 cm (67.99\")   Wt 90 kg (198 lb 6.6 oz)   BMI 30.18 kg/m²       Physical " Exam  Vitals and nursing note reviewed.   Constitutional:       Appearance: Normal appearance. The patient is well-developed.   Cardiovascular:      Rate and Rhythm: Normal rate and regular rhythm.   Pulmonary:      Effort: Pulmonary effort is normal.      Breath sounds: Normal air entry.   Abdominal:      General: Bowel sounds are normal.      Palpations: Abdomen is soft.      Skin:     General: Skin is warm and dry.   Neurological:      Mental Status: The patient is alert and oriented to person, place, and time.      Motor: Motor function is intact.   Psychiatric:         Mood and Affect: Mood normal.   Back: He has a cyst in the mid back that is perhaps 3 to 4 cm in greatest diameter.    Result Review :               Assessment and Plan   Diagnoses and all orders for this visit:    1. Dermoid cyst of skin of back (Primary)  -     Case Request; Standing  -     Follow Anesthesia Guidelines / Protocol; Future  -     Follow Anesthesia Guidelines / Protocol; Standing  -     Verify NPO Status; Standing  -     Verify / Perform Chlorhexidine Skin Prep; Standing  -     Insert Peripheral IV; Standing  -     Saline Lock & Maintain IV Access; Standing  -     sodium chloride 0.9 % flush 10 mL  -     sodium chloride 0.9 % flush 10 mL  -     sodium chloride 0.9 % infusion 40 mL  -     Place Sequential Compression Device; Standing  -     Maintain Sequential Compression Device; Standing  -     ondansetron (ZOFRAN) injection 4 mg  -     ceFAZolin (ANCEF) 2,000 mg in sodium chloride 0.9 % 100 mL IVPB  -     Case Request    We will schedule him for an excision of this back cyst in the operating room.  I have described that procedure to him as well as the risk and benefits and he is agreeable to proceeding.    I  Josse Layne MD  01/13/2025

## 2025-01-21 NOTE — PRE-PROCEDURE INSTRUCTIONS
ATIENT INSTRUCTED TO BE:    - NOTHING TO EAT AFTER MIDNIGHT OR CHEW, EXCEPT CAN HAVE CLEAR LIQUIDS 2 HOURS PRIOR TO SURGERY ARRIVAL TIME , NO MORE THAN 8 OZ. (NOTHING RED)     - TO HOLD ALL VITAMINS, SUPPLEMENTS, NSAIDS FOR ONE WEEK PRIOR TO THEIR SURGICAL PROCEDURE    - DO NOT TAKE _____________NA_________ 7 DAYS PRIOR TO PROCEDURE PER ANESTHESIA RECOMMENDATIONS/INSTRUCTIONS     - INSTRUCTED PT TO USE SURGICAL SOAP 1 TIME THE NIGHT PRIOR TO SURGERY ___________ OR THE AM OF SURGERY _____________   USE THE SOAP FROM NECK TO TOES, AVOID THEIR FACE, HAIR, AND PRIVATE PARTS. IF USE THE SOAP THE NIGHT PRIOR TO SURGERY, CHANGE BED LINENS AND NO PETS IN THE BED.     INSTRUCTED NO LOTIONS, JEWELRY, PIERCINGS,  NAIL POLISH, OR DEODORANT DAY OF SURGERY    - IF DIABETIC, CHECK BLOOD GLUCOSE IF LESS THAN 70 OR HAVING SYMPTOMS CALL THE PREOP AREA FOR INSTRUCTIONS ON AM OF SURGERY ( [phone])    -INSTRUCTED TO TAKE THE FOLLOWING MEDICATIONS THE DAY OF SURGERY WITH SIPS OF WATER:   CONTINUE TO HOLD PLAVIX    TYLENOL AM DOS AS NEEDED        - DO NOT BRING ANY MEDICATIONS WITH YOU TO THE HOSPITAL THE DAY OF SURGERY, EXCEPT IF USE INHALERS. BRING INHALERS DAY OF SURGERY       - BRING CPAP OR BIPAP TO THE HOSPITAL ONLY IF YOU ARE SPENDING THE NIGHT    - DO NOT SMOKE OR VAPE 24 HOURS PRIOR TO PROCEDURE PER ANESTHESIA REQUEST     -MAKE SURE YOU HAVE A RIDE HOME OR SOMEONE TO STAY WITH YOU THE DAY OF THE PROCEDURE AFTER YOU GO HOME     - FOLLOW ANY OTHER INSTRUCTIONS GIVEN TO YOU BY YOUR SURGEON'S OFFICE.     - DAY OF SURGERY ____________, COME TO [facility]/ [facility], [facility] FLOOR. CHECK IN AT THE DESK FOR REGISTRATION/SURGERY    - YOU WILL RECEIVE A PHONE CALL THE DAY PRIOR TO SURGERY BETWEEN 1PM AND 4 PM WITH ARRIVAL TIME, IF YOUR SURGERY IS ON A MONDAY YOU WILL RECEIVE A CALL THE FRIDAY PRIOR TO SURGERY DATE    - BRING CASH OR CREDIT CARD FOR COPAYMENT OF MEDICATIONS AFTER SURGERY IF YOU USE THE HOSPITAL PHARMACY (MEDS TO  BED)    - PREADMISSION TESTING NURSE [place] DEVLIN [phone] IF HAVE ANY QUESTIONS     -PATIENT PROVIDED THE NUMBER FOR PREOP SURGICAL DEPT IF HAD QUESTIONS AFTER HOURS PRIOR TO SURGERY ([phone]).  INFORMED PT IF NO ANSWER, LEAVE A MESSAGE AND SOMEONE WILL RETURN THEIR CALL       PATIENT VERBALIZED UNDERSTANDING

## 2025-01-23 ENCOUNTER — HOSPITAL ENCOUNTER (OUTPATIENT)
Facility: HOSPITAL | Age: 65
Setting detail: HOSPITAL OUTPATIENT SURGERY
Discharge: HOME | End: 2025-01-23
Attending: SURGERY | Admitting: SURGERY
Payer: OTHER GOVERNMENT

## 2025-01-23 ENCOUNTER — ANESTHESIA (OUTPATIENT)
Dept: PERIOP | Facility: HOSPITAL | Age: 65
End: 2025-01-23

## 2025-01-23 ENCOUNTER — ANESTHESIA EVENT (OUTPATIENT)
Dept: PERIOP | Facility: HOSPITAL | Age: 65
End: 2025-01-23

## 2025-01-23 VITALS
OXYGEN SATURATION: 99 % | TEMPERATURE: 98.1 F | WEIGHT: 197.09 LBS | RESPIRATION RATE: 18 BRPM | HEIGHT: 68 IN | HEART RATE: 66 BPM | BODY MASS INDEX: 29.87 KG/M2 | SYSTOLIC BLOOD PRESSURE: 141 MMHG | DIASTOLIC BLOOD PRESSURE: 76 MMHG

## 2025-01-23 DIAGNOSIS — D23.5 DERMOID CYST OF SKIN OF BACK: ICD-10-CM

## 2025-01-23 PROCEDURE — 25010000002 CEFAZOLIN PER 500 MG: Performed by: SURGERY

## 2025-01-23 PROCEDURE — 25010000002 PROPOFOL 10 MG/ML EMULSION: Performed by: ANESTHESIOLOGY

## 2025-01-23 PROCEDURE — 25010000002 MIDAZOLAM PER 1MG: Performed by: ANESTHESIOLOGY

## 2025-01-23 PROCEDURE — 25010000002 LIDOCAINE PF 2% 2 % SOLUTION: Performed by: ANESTHESIOLOGY

## 2025-01-23 PROCEDURE — 25810000003 LACTATED RINGERS PER 1000 ML: Performed by: ANESTHESIOLOGY

## 2025-01-23 PROCEDURE — 25010000002 DEXAMETHASONE PER 1 MG: Performed by: ANESTHESIOLOGY

## 2025-01-23 PROCEDURE — 11404 EXC TR-EXT B9+MARG 3.1-4 CM: CPT | Performed by: SURGERY

## 2025-01-23 PROCEDURE — 25010000002 FENTANYL CITRATE (PF) 50 MCG/ML SOLUTION: Performed by: ANESTHESIOLOGY

## 2025-01-23 PROCEDURE — 88307 TISSUE EXAM BY PATHOLOGIST: CPT | Performed by: SURGERY

## 2025-01-23 PROCEDURE — 25010000002 BUPIVACAINE (PF) 0.25 % SOLUTION: Performed by: SURGERY

## 2025-01-23 RX ORDER — PROMETHAZINE HYDROCHLORIDE 25 MG/1
25 SUPPOSITORY RECTAL ONCE AS NEEDED
Status: DISCONTINUED | OUTPATIENT
Start: 2025-01-23 | End: 2025-01-23 | Stop reason: HOSPADM

## 2025-01-23 RX ORDER — SODIUM CHLORIDE 9 MG/ML
40 INJECTION, SOLUTION INTRAVENOUS AS NEEDED
Status: DISCONTINUED | OUTPATIENT
Start: 2025-01-23 | End: 2025-01-23 | Stop reason: HOSPADM

## 2025-01-23 RX ORDER — HYDROCODONE BITARTRATE AND ACETAMINOPHEN 5; 325 MG/1; MG/1
1 TABLET ORAL EVERY 6 HOURS PRN
Qty: 10 TABLET | Refills: 0 | Status: SHIPPED | OUTPATIENT
Start: 2025-01-23

## 2025-01-23 RX ORDER — PROPOFOL 10 MG/ML
VIAL (ML) INTRAVENOUS AS NEEDED
Status: DISCONTINUED | OUTPATIENT
Start: 2025-01-23 | End: 2025-01-23 | Stop reason: SURG

## 2025-01-23 RX ORDER — LIDOCAINE HYDROCHLORIDE 20 MG/ML
INJECTION, SOLUTION EPIDURAL; INFILTRATION; INTRACAUDAL; PERINEURAL AS NEEDED
Status: DISCONTINUED | OUTPATIENT
Start: 2025-01-23 | End: 2025-01-23 | Stop reason: SURG

## 2025-01-23 RX ORDER — MIDAZOLAM HYDROCHLORIDE 2 MG/2ML
2 INJECTION, SOLUTION INTRAMUSCULAR; INTRAVENOUS ONCE
Status: COMPLETED | OUTPATIENT
Start: 2025-01-23 | End: 2025-01-23

## 2025-01-23 RX ORDER — OXYCODONE HYDROCHLORIDE 5 MG/1
5 TABLET ORAL
Status: DISCONTINUED | OUTPATIENT
Start: 2025-01-23 | End: 2025-01-23 | Stop reason: HOSPADM

## 2025-01-23 RX ORDER — DEXAMETHASONE SODIUM PHOSPHATE 4 MG/ML
INJECTION, SOLUTION INTRA-ARTICULAR; INTRALESIONAL; INTRAMUSCULAR; INTRAVENOUS; SOFT TISSUE AS NEEDED
Status: DISCONTINUED | OUTPATIENT
Start: 2025-01-23 | End: 2025-01-23 | Stop reason: SURG

## 2025-01-23 RX ORDER — SODIUM CHLORIDE 0.9 % (FLUSH) 0.9 %
10 SYRINGE (ML) INJECTION AS NEEDED
Status: DISCONTINUED | OUTPATIENT
Start: 2025-01-23 | End: 2025-01-23 | Stop reason: HOSPADM

## 2025-01-23 RX ORDER — ONDANSETRON 2 MG/ML
4 INJECTION INTRAMUSCULAR; INTRAVENOUS EVERY 6 HOURS PRN
Status: DISCONTINUED | OUTPATIENT
Start: 2025-01-23 | End: 2025-01-23 | Stop reason: HOSPADM

## 2025-01-23 RX ORDER — MEPERIDINE HYDROCHLORIDE 25 MG/ML
12.5 INJECTION INTRAMUSCULAR; INTRAVENOUS; SUBCUTANEOUS
Status: DISCONTINUED | OUTPATIENT
Start: 2025-01-23 | End: 2025-01-23 | Stop reason: HOSPADM

## 2025-01-23 RX ORDER — SODIUM CHLORIDE, SODIUM LACTATE, POTASSIUM CHLORIDE, CALCIUM CHLORIDE 600; 310; 30; 20 MG/100ML; MG/100ML; MG/100ML; MG/100ML
9 INJECTION, SOLUTION INTRAVENOUS CONTINUOUS PRN
Status: DISCONTINUED | OUTPATIENT
Start: 2025-01-23 | End: 2025-01-23 | Stop reason: HOSPADM

## 2025-01-23 RX ORDER — ONDANSETRON 2 MG/ML
4 INJECTION INTRAMUSCULAR; INTRAVENOUS ONCE AS NEEDED
Status: DISCONTINUED | OUTPATIENT
Start: 2025-01-23 | End: 2025-01-23 | Stop reason: HOSPADM

## 2025-01-23 RX ORDER — BUPIVACAINE HYDROCHLORIDE 2.5 MG/ML
INJECTION, SOLUTION EPIDURAL; INFILTRATION; INTRACAUDAL AS NEEDED
Status: DISCONTINUED | OUTPATIENT
Start: 2025-01-23 | End: 2025-01-23 | Stop reason: HOSPADM

## 2025-01-23 RX ORDER — FENTANYL CITRATE 50 UG/ML
INJECTION, SOLUTION INTRAMUSCULAR; INTRAVENOUS AS NEEDED
Status: DISCONTINUED | OUTPATIENT
Start: 2025-01-23 | End: 2025-01-23 | Stop reason: SURG

## 2025-01-23 RX ORDER — ACETAMINOPHEN 500 MG
1000 TABLET ORAL ONCE
Status: COMPLETED | OUTPATIENT
Start: 2025-01-23 | End: 2025-01-23

## 2025-01-23 RX ORDER — PROMETHAZINE HYDROCHLORIDE 12.5 MG/1
25 TABLET ORAL ONCE AS NEEDED
Status: DISCONTINUED | OUTPATIENT
Start: 2025-01-23 | End: 2025-01-23 | Stop reason: HOSPADM

## 2025-01-23 RX ORDER — SODIUM CHLORIDE 0.9 % (FLUSH) 0.9 %
10 SYRINGE (ML) INJECTION EVERY 12 HOURS SCHEDULED
Status: DISCONTINUED | OUTPATIENT
Start: 2025-01-23 | End: 2025-01-23 | Stop reason: HOSPADM

## 2025-01-23 RX ORDER — ONDANSETRON 4 MG/1
4 TABLET, ORALLY DISINTEGRATING ORAL ONCE AS NEEDED
Status: DISCONTINUED | OUTPATIENT
Start: 2025-01-23 | End: 2025-01-23 | Stop reason: HOSPADM

## 2025-01-23 RX ORDER — HYDROCODONE BITARTRATE AND ACETAMINOPHEN 5; 325 MG/1; MG/1
1 TABLET ORAL ONCE AS NEEDED
Status: DISCONTINUED | OUTPATIENT
Start: 2025-01-23 | End: 2025-01-23 | Stop reason: HOSPADM

## 2025-01-23 RX ADMIN — SODIUM CHLORIDE, POTASSIUM CHLORIDE, SODIUM LACTATE AND CALCIUM CHLORIDE 9 ML/HR: 600; 310; 30; 20 INJECTION, SOLUTION INTRAVENOUS at 10:20

## 2025-01-23 RX ADMIN — PROPOFOL 200 MG: 10 INJECTION, EMULSION INTRAVENOUS at 11:40

## 2025-01-23 RX ADMIN — LIDOCAINE HYDROCHLORIDE 100 MG: 20 INJECTION, SOLUTION EPIDURAL; INFILTRATION; INTRACAUDAL; PERINEURAL at 11:40

## 2025-01-23 RX ADMIN — SODIUM CHLORIDE 2000 MG: 9 INJECTION, SOLUTION INTRAVENOUS at 11:36

## 2025-01-23 RX ADMIN — ACETAMINOPHEN 1000 MG: 500 TABLET ORAL at 10:20

## 2025-01-23 RX ADMIN — MIDAZOLAM HYDROCHLORIDE 2 MG: 1 INJECTION, SOLUTION INTRAMUSCULAR; INTRAVENOUS at 11:26

## 2025-01-23 RX ADMIN — FENTANYL CITRATE 50 MCG: 50 INJECTION, SOLUTION INTRAMUSCULAR; INTRAVENOUS at 11:48

## 2025-01-23 RX ADMIN — DEXAMETHASONE SODIUM PHOSPHATE 4 MG: 4 INJECTION, SOLUTION INTRAMUSCULAR; INTRAVENOUS at 11:56

## 2025-01-23 RX ADMIN — FENTANYL CITRATE 50 MCG: 50 INJECTION, SOLUTION INTRAMUSCULAR; INTRAVENOUS at 11:56

## 2025-01-23 NOTE — OP NOTE
EXCISION CYST  Procedure Report    Patient Name:  Rafael Cee  YOB: 1960    Date of Surgery:  1/23/2025     Indications: The patient is a 64-year-old gentleman that presents with a symptomatic sebaceous cyst of the central back.  The decision was made to proceed with an excision of this back cyst.    Pre-op Diagnosis: Back cyst    Post-Op Diagnosis: Same    Procedure/CPT® Codes:    Excision of back cyst    Staff:  Surgeon(s):  Josse Layne MD    Assistant: Juan C Flynn    Anesthesia: Monitored Anesthesia Care    Estimated Blood Loss: 3 mL    Implants:    Nothing was implanted during the procedure    Specimen:          Specimens       ID Source Type Tests Collected By Collected At Frozen?    A Back, Upper Tissue TISSUE PATHOLOGY EXAM   Josse Layne MD 1/23/25 0214     Description: Back cyst                Findings: 4 cm sebaceous cyst    Complications: None    Description of Procedure: The patient was taken the operating room and placed on the table in supine position.  After administration of MAC anesthesia he was placed in left lateral decubitus position and his back was prepped and draped sterilely.  He had an obvious sizable sebaceous cyst of the central back.  We anesthetized the skin overlying this with quarter percent Marcaine and then made a transverse incision over the cyst with a 15 blade scalpel.  The cyst was excised with the scalpel and cautery and removed from the field.  The cyst was about 4 cm in greatest diameter.  We then achieved adequate hemostasis with cautery.  The wound was irrigated out with copious amounts of sterile saline and suctioned dry.  The skin was then closed with interrupted 3-0 nylon sutures.  Sterile dressings were applied and he was taken the postanesthesia recovery room in stable condition.    Assistant: Juan C Flynn  was responsible for performing the following activities: Retraction, Suction, Irrigation, and Placing Dressing and their  skilled assistance was necessary for the success of this case.    Josse Layne MD     Date: 1/23/2025  Time: 12:25 EST

## 2025-01-23 NOTE — DISCHARGE INSTRUCTIONS
DISCHARGE INSTRUCTIONS  SURGICAL / AMBULATORY  PROCEDURES      For your surgery you had:  General anesthesia (you may have a sore throat for the first 24 hours)  IV sedation.  Local anesthesia  Monitored anesthesia Care  You received a medicated patch for nausea prevention today (behind your ear). It is recommended that you remove it 24-48 hours post-operatively. It must be removed within 72 hours.   You have received an anesthesia medication today that can cause hormonal forms of birth control to be ineffective. You should use a different form of birth control (to prevent pregnancy) for 7 days.   You may experience dizziness, drowsiness, or light-headedness for several hours following surgery/procedure.  Do not stay alone today or tonight.  Limit your activity for 24 hours.  Resume your diet slowly.  Follow whatever special dietary instructions you may have been given by your doctor.  You should not drive or operate machinery, drink alcohol, or sign legally binding documents for 24 hours or while you are taking pain medication.  Last dose of pain medication was given at:   .  NOTIFY YOUR DOCTOR IF YOU EXPERIENCE ANY OF THE FOLLOWING:  Temperature greater than 101 degrees Fahrenheit  Shaking Chills  Redness or excessive drainage from incision  Nausea, vomiting and/or pain that is not controlled by prescribed medications  Increase in bleeding or bleeding that is excessive  Unable to urinate in 6 hours after surgery  If unable to reach your doctor, please go to the closest Emergency Room  You may begin dressing changes:      [] in 48 hours     You may shower 48 hours saturday  .  Apply an ice pack 24-48 hours.  Medications per physician instructions as indicated on Discharge Medication Information Sheet.  SPECIAL INSTRUCTIONS:  May take an over the counter stool softener as needed

## 2025-01-23 NOTE — ANESTHESIA PREPROCEDURE EVALUATION
Anesthesia Evaluation     Patient summary reviewed and Nursing notes reviewed   no history of anesthetic complications:   NPO Solid Status: > 8 hours  NPO Liquid Status: > 2 hours           Airway   Mallampati: II  TM distance: >3 FB  Neck ROM: full  No difficulty expected  Dental    (+) upper dentures        Pulmonary - normal exam    breath sounds clear to auscultation  (+) a smoker Current, Abstained day of surgery, cigarettes,  Cardiovascular - normal exam  Exercise tolerance: good (4-7 METS)    PT is on anticoagulation therapy  Rhythm: regular  Rate: normal    (+) hypertension (lisinopril) less than 2 medications, PVD (5/23/2024: stenting of SFA), hyperlipidemia    ROS comment: Plavix (LD 1/18/25)      Neuro/Psych- negative ROS  GI/Hepatic/Renal/Endo - negative ROS     Musculoskeletal (-) negative ROS    Abdominal  - normal exam   Substance History - negative use     OB/GYN negative ob/gyn ROS         Other - negative ROS       ROS/Med Hx Other: PAT Nursing Notes unavailable.                     Anesthesia Plan    ASA 2     general and MAC     (Patient understands anesthesia not responsible for dental damage.)  intravenous induction     Anesthetic plan, risks, benefits, and alternatives have been provided, discussed and informed consent has been obtained with: patient.    Use of blood products discussed with patient .    Plan discussed with CRNA.        CODE STATUS:

## 2025-01-23 NOTE — ANESTHESIA POSTPROCEDURE EVALUATION
Patient: Rafael Cee    Procedure Summary       Date: 01/23/25 Room / Location: [facility]  /  [facility]    Anesthesia Start: 1136 Anesthesia Stop: 1225    Procedure: Excision of back cyst-remove dermoid cyst from back Diagnosis:       Dermoid cyst of skin of back      (Dermoid cyst of skin of back [D23.5])    Surgeons: Josse Layne MD Provider: Dora Samson MD    Anesthesia Type: general, MAC ASA Status: 2            Anesthesia Type: general, MAC    Vitals  Vitals Value Taken Time   /74 01/23/25 1245   Temp 36.6 °C (97.8 °F) 01/23/25 1223   Pulse 65 01/23/25 1252   Resp 18 01/23/25 1240   SpO2 98 % 01/23/25 1252   Vitals shown include unfiled device data.        Post Anesthesia Care and Evaluation    Patient location during evaluation: bedside  Patient participation: complete - patient participated  Level of consciousness: awake  Pain management: adequate    Airway patency: patent  PONV Status: none  Cardiovascular status: acceptable and stable  Respiratory status: acceptable  Hydration status: acceptable

## 2025-01-27 LAB
CYTO UR: NORMAL
LAB AP CASE REPORT: NORMAL
LAB AP CLINICAL INFORMATION: NORMAL
PATH REPORT.FINAL DX SPEC: NORMAL
PATH REPORT.GROSS SPEC: NORMAL

## 2025-01-29 ENCOUNTER — TELEPHONE (OUTPATIENT)
Dept: SURGERY | Facility: CLINIC | Age: 65
End: 2025-01-29

## 2025-01-29 NOTE — TELEPHONE ENCOUNTER
"PATIENT CALLED BACK TO F/U ON MESSAGE.  HE SAID HE IS AT WORK AND THERE IS A WET SPOT 3 \"- 4\" ON HIS BACK WHERE THE SURGERY WAS DONE.    I TOLD HIM THE NP HAS SENT A MESSAGE TO HER MA TO CALL HIM TO SCHEDULE AN APPOINTMENT WITH HER OR DR. OWENS.  I TOLD HIM THE OFFICE JUST CLOSED, SO I WILL SEND A MESSAGE FOR HIM TO CALL HIM IN THE MORNING.    PATIENT SAID HE ISN'T SURE IF IT CAN WAIT.  I TOLD HIM IF ANYTHING FEELS EMERGENT, HE MAY GO TO THE ER.    #[phone]  "

## 2025-01-29 NOTE — TELEPHONE ENCOUNTER
FRIEND CALLED BACK TO CHECK ON THE MESSAGE. SHE REPORTS THAT HIS BACK IS FLAMING RED AND LEAKING.

## 2025-01-29 NOTE — TELEPHONE ENCOUNTER
EXCISION OF BACK CYST 01/23/25 BY DR. OWENS.    PATIENT CALLED AND SAID HE THINKS THE CYST ON HIS BACK HAS BURST.  IT IS DRAINING AND PAINFUL.  HE IS AT WORK AND SOMEONE TOLD HIM HE HAD A BIG WET SPOT ON HIS BACK.    #[phone]

## 2025-01-30 NOTE — TELEPHONE ENCOUNTER
Christine called the patient to offer him an appointment with April but he can't come in today or tomorrow.

## 2025-02-07 ENCOUNTER — OFFICE VISIT (OUTPATIENT)
Dept: SURGERY | Facility: CLINIC | Age: 65
End: 2025-02-07
Payer: OTHER GOVERNMENT

## 2025-02-07 VITALS — BODY MASS INDEX: 29.98 KG/M2 | RESPIRATION RATE: 16 BRPM | WEIGHT: 197.8 LBS | HEIGHT: 68 IN

## 2025-02-07 DIAGNOSIS — D23.5 DERMOID CYST OF SKIN OF BACK: Primary | ICD-10-CM

## 2025-02-07 PROCEDURE — 99024 POSTOP FOLLOW-UP VISIT: CPT | Performed by: SURGERY

## 2025-02-07 NOTE — PROGRESS NOTES
"Chief Complaint  Post-op (Excision of back cyst)    Subjective          Rafael Cee presents to [facility]  History of Present Illness    Rafael Cee is a 64 y.o. male  who presents today for a postoperative visit.     Patient is here for a follow-up after an excision of a sebaceous cyst from the central back.  He is doing well and had no complaints today.    Past History:  Medical History: has a past medical history of Dermoid cyst of skin of back, Diverticulosis, Hyperlipidemia, Hypertension, and PVD (peripheral vascular disease).   Surgical History: has a past surgical history that includes Cardiac catheterization (Left, 04/14/2022); Cardiac catheterization (Right, 05/23/2024); Colonoscopy; Colectomy; and Cyst Removal (N/A, 1/23/2025).   Family History: family history includes Cancer in his mother and sister; Heart attack in his father.   Social History: reports that he has been smoking cigarettes. He has been exposed to tobacco smoke. He has never used smokeless tobacco. He reports current alcohol use of about 3.0 standard drinks of alcohol per week. He reports that he does not use drugs.  Allergies: Aspirin, Ibuprofen, and Nsaids       Current Outpatient Medications:     lisinopril (PRINIVIL,ZESTRIL) 10 MG tablet, Take 1 tablet by mouth 2 (Two) Times a Day., Disp: , Rfl:     mupirocin (BACTROBAN) 2 % ointment, Apply 1 Application topically to the appropriate area as directed 3 (Three) Times a Day., Disp: 1 g, Rfl: 0    clopidogrel (PLAVIX) 75 MG tablet, Take 1 tablet by mouth Daily. (Patient not taking: Reported on 2/7/2025), Disp: , Rfl:     HYDROcodone-acetaminophen (NORCO) 5-325 MG per tablet, Take 1 tablet by mouth Every 6 (Six) Hours As Needed (Pain). (Patient not taking: Reported on 2/7/2025), Disp: 10 tablet, Rfl: 0       Physical Exam  His incision is healing up fine we went ahead and removed his sutures.  Objective     Vital Signs:   Resp 16   Ht 172.7 cm (68\")   " Wt 89.7 kg (197 lb 12.8 oz)   BMI 30.08 kg/m²              Assessment and Plan    Diagnoses and all orders for this visit:    1. Dermoid cyst of skin of back (Primary)    I will see him back on an as-needed basis.  I have asked him to call me if he were to have any further questions or concerns.

## 2025-08-21 ENCOUNTER — TRANSCRIBE ORDERS (OUTPATIENT)
Age: 65
End: 2025-08-21
Payer: OTHER GOVERNMENT

## 2025-08-21 DIAGNOSIS — I73.9 PVD (PERIPHERAL VASCULAR DISEASE): Primary | ICD-10-CM

## (undated) DEVICE — KT INTRO MIC VSI SMOTH STFF 4F 40CM 7CM

## (undated) DEVICE — NAVICROSS SUPPORT CATHETER: Brand: NAVICROSS

## (undated) DEVICE — SI AVANTI+ 6F STD W/GW  NO OBT: Brand: AVANTI

## (undated) DEVICE — ANGIOGRAPHY PACK: Brand: MEDLINE INDUSTRIES, INC.

## (undated) DEVICE — HI-TORQUE SPARTACORE 14 PERIPHERAL GUIDE WIRE .014 5.0 CM X 300 CM: Brand: SPARTACORE

## (undated) DEVICE — GW STARTER JB STR .035 15X180CM

## (undated) DEVICE — GW PERIPH VASSALLO/GT FLOP 0.14IN 300CM STR

## (undated) DEVICE — PENCL SMOKE/EVAC MEGADYNE TELESCP 10FT

## (undated) DEVICE — INTRO SHEATH PRELUDE/PRO .035 5F 11X50CM GRY LF

## (undated) DEVICE — DESTINATION RENAL GUIDING SHEATH: Brand: DESTINATION

## (undated) DEVICE — Device

## (undated) DEVICE — MAJOR-LF: Brand: MEDLINE INDUSTRIES, INC.

## (undated) DEVICE — INTRO SHEATH PRELUDE PRO MARKRTIP 6F11

## (undated) DEVICE — GOWN,REINFRCE,POLY,SIRUS,BREATH SLV,XXLG: Brand: MEDLINE

## (undated) DEVICE — THE STERILE LIGHT HANDLE COVER IS USED WITH STERIS SURGICAL LIGHTING AND VISUALIZATION SYSTEMS.

## (undated) DEVICE — SOL IRR NACL 0.9PCT BO 1000ML

## (undated) DEVICE — BALN EVERCROSS OTW .035 5F 6X60 135

## (undated) DEVICE — CATH OMNI FLUSH 5FR

## (undated) DEVICE — GLV SURG SENSICARE PI ORTHO SZ8 LF STRL

## (undated) DEVICE — DEV CLS VASC MYNXCONTROL 6FTO7F

## (undated) DEVICE — GAUZE,SPONGE,4"X4",16PLY,STRL,LF,10/TRAY: Brand: MEDLINE

## (undated) DEVICE — STERILE POLYISOPRENE POWDER-FREE SURGICAL GLOVES WITH EMOLLIENT COATING: Brand: PROTEXIS

## (undated) DEVICE — BALN NANOCROSS OTW .014 4F 3X80 150CM

## (undated) DEVICE — DRAPE,U/ SHT,SPLIT,PLAS,STERIL: Brand: MEDLINE

## (undated) DEVICE — STRIP CLS WND SUTURESTRIP/PLS 0.5X4IN TP1103

## (undated) DEVICE — INTENDED FOR TISSUE SEPARATION, AND OTHER PROCEDURES THAT REQUIRE A SHARP SURGICAL BLADE TO PUNCTURE OR CUT.: Brand: BARD-PARKER ® CARBON RIB-BACK BLADES

## (undated) DEVICE — CVR PROB ULTRASND GLS STRL